# Patient Record
Sex: FEMALE | Race: BLACK OR AFRICAN AMERICAN | NOT HISPANIC OR LATINO | URBAN - METROPOLITAN AREA
[De-identification: names, ages, dates, MRNs, and addresses within clinical notes are randomized per-mention and may not be internally consistent; named-entity substitution may affect disease eponyms.]

---

## 2020-11-15 ENCOUNTER — INPATIENT (INPATIENT)
Facility: HOSPITAL | Age: 35
LOS: 3 days | Discharge: HOME | End: 2020-11-19
Attending: INTERNAL MEDICINE | Admitting: INTERNAL MEDICINE
Payer: MEDICAID

## 2020-11-15 VITALS
HEIGHT: 65 IN | OXYGEN SATURATION: 100 % | WEIGHT: 134.92 LBS | RESPIRATION RATE: 16 BRPM | SYSTOLIC BLOOD PRESSURE: 118 MMHG | DIASTOLIC BLOOD PRESSURE: 90 MMHG | TEMPERATURE: 98 F | HEART RATE: 60 BPM

## 2020-11-15 DIAGNOSIS — Z98.890 OTHER SPECIFIED POSTPROCEDURAL STATES: Chronic | ICD-10-CM
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
## 2020-11-15 LAB
ALBUMIN SERPL ELPH-MCNC: 2.5 G/DL — LOW (ref 3.3–5)
ALP SERPL-CCNC: 102 U/L — SIGNIFICANT CHANGE UP (ref 40–120)
ALT FLD-CCNC: 21 U/L — SIGNIFICANT CHANGE UP (ref 12–78)
AMMONIA BLD-MCNC: 29 UMOL/L — SIGNIFICANT CHANGE UP (ref 11–32)
AMPHET UR-MCNC: NEGATIVE — SIGNIFICANT CHANGE UP
ANION GAP SERPL CALC-SCNC: 8 MMOL/L — SIGNIFICANT CHANGE UP (ref 5–17)
APAP SERPL-MCNC: <2 UG/ML — LOW (ref 10–30)
APPEARANCE UR: CLEAR — SIGNIFICANT CHANGE UP
AST SERPL-CCNC: 28 U/L — SIGNIFICANT CHANGE UP (ref 15–37)
BACTERIA # UR AUTO: ABNORMAL
BARBITURATES UR SCN-MCNC: NEGATIVE — SIGNIFICANT CHANGE UP
BASOPHILS # BLD AUTO: 0.12 K/UL — SIGNIFICANT CHANGE UP (ref 0–0.2)
BASOPHILS NFR BLD AUTO: 1.1 % — SIGNIFICANT CHANGE UP (ref 0–2)
BENZODIAZ UR-MCNC: NEGATIVE — SIGNIFICANT CHANGE UP
BILIRUB SERPL-MCNC: 1.7 MG/DL — HIGH (ref 0.2–1.2)
BILIRUB UR-MCNC: NEGATIVE — SIGNIFICANT CHANGE UP
BUN SERPL-MCNC: 30 MG/DL — HIGH (ref 7–23)
CALCIUM SERPL-MCNC: 7.8 MG/DL — LOW (ref 8.5–10.1)
CHLORIDE SERPL-SCNC: 108 MMOL/L — SIGNIFICANT CHANGE UP (ref 96–108)
CO2 SERPL-SCNC: 22 MMOL/L — SIGNIFICANT CHANGE UP (ref 22–31)
COCAINE METAB.OTHER UR-MCNC: NEGATIVE — SIGNIFICANT CHANGE UP
COLOR SPEC: YELLOW — SIGNIFICANT CHANGE UP
CREAT SERPL-MCNC: 1.62 MG/DL — HIGH (ref 0.5–1.3)
DIFF PNL FLD: NEGATIVE — SIGNIFICANT CHANGE UP
EOSINOPHIL # BLD AUTO: 0.08 K/UL — SIGNIFICANT CHANGE UP (ref 0–0.5)
EOSINOPHIL NFR BLD AUTO: 0.7 % — SIGNIFICANT CHANGE UP (ref 0–6)
EPI CELLS # UR: ABNORMAL
ETHANOL SERPL-MCNC: <10 MG/DL — SIGNIFICANT CHANGE UP (ref 0–10)
GLUCOSE BLDC GLUCOMTR-MCNC: 125 MG/DL — HIGH (ref 70–99)
GLUCOSE BLDC GLUCOMTR-MCNC: 147 MG/DL — HIGH (ref 70–99)
GLUCOSE BLDC GLUCOMTR-MCNC: 155 MG/DL — HIGH (ref 70–99)
GLUCOSE BLDC GLUCOMTR-MCNC: 161 MG/DL — HIGH (ref 70–99)
GLUCOSE BLDC GLUCOMTR-MCNC: 172 MG/DL — HIGH (ref 70–99)
GLUCOSE SERPL-MCNC: 175 MG/DL — HIGH (ref 70–99)
GLUCOSE UR QL: NEGATIVE MG/DL — SIGNIFICANT CHANGE UP
HCG SERPL-ACNC: <1 MIU/ML — SIGNIFICANT CHANGE UP
HCT VFR BLD CALC: 38 % — SIGNIFICANT CHANGE UP (ref 34.5–45)
HGB BLD-MCNC: 11.2 G/DL — LOW (ref 11.5–15.5)
HYALINE CASTS # UR AUTO: ABNORMAL /LPF
IMM GRANULOCYTES NFR BLD AUTO: 0.4 % — SIGNIFICANT CHANGE UP (ref 0–1.5)
KETONES UR-MCNC: NEGATIVE — SIGNIFICANT CHANGE UP
LACTATE SERPL-SCNC: 2.1 MMOL/L — HIGH (ref 0.7–2)
LEUKOCYTE ESTERASE UR-ACNC: NEGATIVE — SIGNIFICANT CHANGE UP
LIDOCAIN IGE QN: 59 U/L — LOW (ref 73–393)
LYMPHOCYTES # BLD AUTO: 1.07 K/UL — SIGNIFICANT CHANGE UP (ref 1–3.3)
LYMPHOCYTES # BLD AUTO: 9.4 % — LOW (ref 13–44)
MAGNESIUM SERPL-MCNC: 1.8 MG/DL — SIGNIFICANT CHANGE UP (ref 1.6–2.6)
MCHC RBC-ENTMCNC: 24.8 PG — LOW (ref 27–34)
MCHC RBC-ENTMCNC: 29.5 GM/DL — LOW (ref 32–36)
MCV RBC AUTO: 84.1 FL — SIGNIFICANT CHANGE UP (ref 80–100)
METHADONE UR-MCNC: NEGATIVE — SIGNIFICANT CHANGE UP
MONOCYTES # BLD AUTO: 0.97 K/UL — HIGH (ref 0–0.9)
MONOCYTES NFR BLD AUTO: 8.6 % — SIGNIFICANT CHANGE UP (ref 2–14)
NEUTROPHILS # BLD AUTO: 9.04 K/UL — HIGH (ref 1.8–7.4)
NEUTROPHILS NFR BLD AUTO: 79.8 % — HIGH (ref 43–77)
NITRITE UR-MCNC: NEGATIVE — SIGNIFICANT CHANGE UP
NRBC # BLD: 0 /100 WBCS — SIGNIFICANT CHANGE UP (ref 0–0)
OPIATES UR-MCNC: NEGATIVE — SIGNIFICANT CHANGE UP
PCP SPEC-MCNC: SIGNIFICANT CHANGE UP
PCP UR-MCNC: NEGATIVE — SIGNIFICANT CHANGE UP
PH UR: 5 — SIGNIFICANT CHANGE UP (ref 5–8)
PLATELET # BLD AUTO: 385 K/UL — SIGNIFICANT CHANGE UP (ref 150–400)
POTASSIUM SERPL-MCNC: 4.5 MMOL/L — SIGNIFICANT CHANGE UP (ref 3.5–5.3)
POTASSIUM SERPL-SCNC: 4.5 MMOL/L — SIGNIFICANT CHANGE UP (ref 3.5–5.3)
PROT SERPL-MCNC: 6.3 GM/DL — SIGNIFICANT CHANGE UP (ref 6–8.3)
PROT UR-MCNC: 100 MG/DL
RBC # BLD: 4.52 M/UL — SIGNIFICANT CHANGE UP (ref 3.8–5.2)
RBC # FLD: 19.5 % — HIGH (ref 10.3–14.5)
SALICYLATES SERPL-MCNC: <1.7 MG/DL — LOW (ref 2.8–20)
SARS-COV-2 RNA SPEC QL NAA+PROBE: SIGNIFICANT CHANGE UP
SODIUM SERPL-SCNC: 138 MMOL/L — SIGNIFICANT CHANGE UP (ref 135–145)
SP GR SPEC: 1.01 — SIGNIFICANT CHANGE UP (ref 1.01–1.02)
THC UR QL: NEGATIVE — SIGNIFICANT CHANGE UP
UROBILINOGEN FLD QL: NEGATIVE MG/DL — SIGNIFICANT CHANGE UP
WBC # BLD: 11.33 K/UL — HIGH (ref 3.8–10.5)
WBC # FLD AUTO: 11.33 K/UL — HIGH (ref 3.8–10.5)

## 2020-11-15 PROCEDURE — 93010 ELECTROCARDIOGRAM REPORT: CPT

## 2020-11-15 PROCEDURE — 74177 CT ABD & PELVIS W/CONTRAST: CPT | Mod: 26,MC

## 2020-11-15 PROCEDURE — G1004: CPT

## 2020-11-15 PROCEDURE — 99285 EMERGENCY DEPT VISIT HI MDM: CPT

## 2020-11-15 PROCEDURE — 70450 CT HEAD/BRAIN W/O DYE: CPT | Mod: 26,ME

## 2020-11-15 PROCEDURE — 99291 CRITICAL CARE FIRST HOUR: CPT

## 2020-11-15 RX ORDER — HEPARIN SODIUM 5000 [USP'U]/ML
5000 INJECTION INTRAVENOUS; SUBCUTANEOUS EVERY 8 HOURS
Refills: 0 | Status: DISCONTINUED | OUTPATIENT
Start: 2020-11-15 | End: 2020-11-19

## 2020-11-15 RX ORDER — ONDANSETRON 8 MG/1
4 TABLET, FILM COATED ORAL ONCE
Refills: 0 | Status: COMPLETED | OUTPATIENT
Start: 2020-11-15 | End: 2020-11-15

## 2020-11-15 RX ORDER — SODIUM CHLORIDE 9 MG/ML
1000 INJECTION INTRAMUSCULAR; INTRAVENOUS; SUBCUTANEOUS ONCE
Refills: 0 | Status: COMPLETED | OUTPATIENT
Start: 2020-11-15 | End: 2020-11-15

## 2020-11-15 RX ORDER — PIPERACILLIN AND TAZOBACTAM 4; .5 G/20ML; G/20ML
3.38 INJECTION, POWDER, LYOPHILIZED, FOR SOLUTION INTRAVENOUS EVERY 8 HOURS
Refills: 0 | Status: DISCONTINUED | OUTPATIENT
Start: 2020-11-15 | End: 2020-11-18

## 2020-11-15 RX ORDER — CALCIUM GLUCONATE 100 MG/ML
1 VIAL (ML) INTRAVENOUS ONCE
Refills: 0 | Status: COMPLETED | OUTPATIENT
Start: 2020-11-15 | End: 2020-11-15

## 2020-11-15 RX ORDER — NOREPINEPHRINE BITARTRATE/D5W 8 MG/250ML
0.05 PLASTIC BAG, INJECTION (ML) INTRAVENOUS
Qty: 8 | Refills: 0 | Status: DISCONTINUED | OUTPATIENT
Start: 2020-11-15 | End: 2020-11-17

## 2020-11-15 RX ORDER — SODIUM CHLORIDE 9 MG/ML
1000 INJECTION, SOLUTION INTRAVENOUS
Refills: 0 | Status: DISCONTINUED | OUTPATIENT
Start: 2020-11-15 | End: 2020-11-15

## 2020-11-15 RX ORDER — GLUCAGON INJECTION, SOLUTION 0.5 MG/.1ML
0.05 INJECTION, SOLUTION SUBCUTANEOUS
Qty: 5 | Refills: 0 | Status: DISCONTINUED | OUTPATIENT
Start: 2020-11-15 | End: 2020-11-15

## 2020-11-15 RX ORDER — PIPERACILLIN AND TAZOBACTAM 4; .5 G/20ML; G/20ML
3.38 INJECTION, POWDER, LYOPHILIZED, FOR SOLUTION INTRAVENOUS ONCE
Refills: 0 | Status: COMPLETED | OUTPATIENT
Start: 2020-11-15 | End: 2020-11-15

## 2020-11-15 RX ORDER — CHLORHEXIDINE GLUCONATE 213 G/1000ML
1 SOLUTION TOPICAL
Refills: 0 | Status: DISCONTINUED | OUTPATIENT
Start: 2020-11-15 | End: 2020-11-18

## 2020-11-15 RX ORDER — GLUCAGON INJECTION, SOLUTION 0.5 MG/.1ML
5 INJECTION, SOLUTION SUBCUTANEOUS ONCE
Refills: 0 | Status: COMPLETED | OUTPATIENT
Start: 2020-11-15 | End: 2020-11-15

## 2020-11-15 RX ADMIN — ONDANSETRON 4 MILLIGRAM(S): 8 TABLET, FILM COATED ORAL at 17:32

## 2020-11-15 RX ADMIN — SODIUM CHLORIDE 1000 MILLILITER(S): 9 INJECTION INTRAMUSCULAR; INTRAVENOUS; SUBCUTANEOUS at 11:45

## 2020-11-15 RX ADMIN — HEPARIN SODIUM 5000 UNIT(S): 5000 INJECTION INTRAVENOUS; SUBCUTANEOUS at 21:00

## 2020-11-15 RX ADMIN — SODIUM CHLORIDE 1000 MILLILITER(S): 9 INJECTION INTRAMUSCULAR; INTRAVENOUS; SUBCUTANEOUS at 14:21

## 2020-11-15 RX ADMIN — PIPERACILLIN AND TAZOBACTAM 200 GRAM(S): 4; .5 INJECTION, POWDER, LYOPHILIZED, FOR SOLUTION INTRAVENOUS at 19:00

## 2020-11-15 RX ADMIN — GLUCAGON INJECTION, SOLUTION 5 MILLIGRAM(S): 0.5 INJECTION, SOLUTION SUBCUTANEOUS at 16:32

## 2020-11-15 RX ADMIN — ONDANSETRON 4 MILLIGRAM(S): 8 TABLET, FILM COATED ORAL at 19:45

## 2020-11-15 RX ADMIN — Medication 5.74 MICROGRAM(S)/KG/MIN: at 16:25

## 2020-11-15 RX ADMIN — Medication 100 GRAM(S): at 16:26

## 2020-11-15 RX ADMIN — GLUCAGON INJECTION, SOLUTION 30.6 MG/KG/HR: 0.5 INJECTION, SOLUTION SUBCUTANEOUS at 18:49

## 2020-11-15 NOTE — ED PROVIDER NOTE - PROGRESS NOTE DETAILS
Sw contacted for care of pt's 2 young children. CPS called. Discussed CT brain findings w/ Radio. Pt persistently hypotensive. Will consult ICU for admission. Consider possible tox syndrome - hypoglycemia, hypotension, bradycardic, alerted, ? BB OD.

## 2020-11-15 NOTE — ED ADULT NURSE NOTE - CHIEF COMPLAINT QUOTE
Pt picked up at [facility] for AMS pt hypoglycemic at scene  Oral glucose given with some relief fs on arrival 147

## 2020-11-15 NOTE — H&P ADULT - ASSESSMENT
35 year old female with PMH ? HTN , admitted with AMS, hypoglycemia, hypotension. Concern for B.blocker overdose. Patient confused, unable to give PMH, children with patient at bedside.   Patient is a 35y old  Female who presents with a chief complaint of       PLAN:  ========  NEURO:  - CT head- negative for acute finding , nonspecific hypodensities noted   - MRI in AM   - hope neurologic status will improve with improvement in BP  - complete tox screen     PULM:  -Ct chest: small B/L pleural effusions, ground glass haziness noted   -covid swap pending   - maintain O2 saturation > 92%  CV:  -? b. blocker overdose  -glucagon/ calcium   - may require norepinephrine Infusion Maintain MAP > 65    -History of HTN   - echo in AM   -resume home medications when BP allows     GI:  - NPO   -advance diet as tolerated     RENAL:  -maintain urine output > 0.5cc/kg/hr     :  MICHELE yes [  ] insertion date no [X  ]     ID:  - mild leukocytosis   - evaluate off ABX     ENDO:  glucagon  Injectable 5 milliGRAM(s)    MUSC:   -no deficit noted    35 year old female with PMH ? HTN , admitted with AMS, hypoglycemia, hypotension. Concern for B.blocker overdose. Patient confused, unable to give PMH, children with patient at bedside.   Patient is a 35y old  Female who presents with a chief complaint of       PLAN:  ========  NEURO:  - CT head- negative for acute finding , nonspecific hypodensities noted   - MRI in AM   - hope neurologic status will improve with improvement in BP  - complete tox screen     PULM:  -Ct chest: small B/L pleural effusions, ground glass haziness noted   -covid swap pending   - maintain O2 saturation > 92%  CV:  -? b. blocker overdose  -glucagon/ calcium   - may require norepinephrine Infusion Maintain MAP > 65    -History of HTN   - echo in AM   -resume home medications when BP allows     GI:  - NPO   -advance diet as tolerated     RENAL:  -maintain urine output > 0.5cc/kg/hr     :  MICHELE yes [  ] insertion date no [X  ]     ID:  - mild leukocytosis , lactate 2.1  - will add empiric antibiotics     ENDO:  glucagon  Injectable 5 milliGRAM(s)    MUSC:   -no deficit noted    35 year old female with PMH ? HTN , admitted with AMS, hypoglycemia, hypotension. Concern for B.blocker overdose. Patient confused, unable to give PMH, children with patient at bedside.   Patient is a 35y old  Female who presents with a chief complaint of       PLAN:  ========  NEURO:  - CT head- negative for acute finding , nonspecific hypodensities noted   - MRI in AM   - monitor for neurologic status will improve with improvement in BP  - complete tox screen     PULM:  -Ct chest: small B/L pleural effusions, ground glass haziness noted   -covid swap pending   - maintain O2 saturation > 92%    CV:  -? b. blocker overdose  -glucagon/ calcium   - may require norepinephrine Infusion Maintain MAP > 65    -History of HTN   - echo in AM   -resume home medications when BP allows     GI:  - NPO   -advance diet as tolerated     RENAL:  -maintain urine output > 0.5cc/kg/hr     :  MICHELE yes [  ] insertion date no [X  ]     ID:  - mild leukocytosis , lactate 2.1  - will add empiric antibiotics     ENDO:  glucagon  Injectable 5 milliGRAM(s)    MUSC:   -no deficit noted

## 2020-11-15 NOTE — H&P ADULT - NSHPPHYSICALEXAM_GEN_ALL_CORE
GENERAL: lethargic, disheveled   HEAD:  Atraumatic, Normocephalic, no nuchal rigidity   EYES: EOMI, PERRLA, conjunctiva and sclera clear  ENMT: No tonsillar erythema, exudates, or enlargement; Moist mucous membranes, Good dentition, No lesions  NECK: Supple, No JVD, Normal thyroid  NERVOUS SYSTEM: lethargic, oriented to person only.  Motor Strength 5/5 B/L upper and lower extremities; DTRs 2+ intact and symmetric  CHEST/LUNG: Clear to percussion bilaterally; No rales, rhonchi, wheezing, or rubs  HEART: Bradycardia, No murmurs, rubs, or gallops  ABDOMEN: Softly distended,  Nontender,  Bowel sounds present  EXTREMITIES:  trace edema , 2+ Peripheral Pulses, No clubbing, cyanosis, edema  LYMPH: No lymphadenopathy noted  SKIN: No rashes or lesions

## 2020-11-15 NOTE — H&P ADULT - ATTENDING COMMENTS
34 yo F with unknown past medical hx p/w shock and confusion. Pt arrives from shelter and was recently discharged from outside hospital. Pt w/ AMS initially secondary to hypoglycemia and was treated w/ normal FS now. Pt then found to be in shock despite fluid resuscitation of 4 L in ER. Pt noted to bradycardic and hypotensive and pt able to report that she took carvedilol at home. Given constellation of signs concern for BB overdose. No sign of sepsis or focal infection. Pt without nuchal rigidity or HA or fever so suspicion meningitis low. Moving all extremities equally. CTH w/o acute findings but will need MRI to help evaluate once pt stable for transport. Utox ordered. f/u pan cx. f/u covid. CT A/P negative. Started on empiric abx. Given vasopressors-maintain map>65, calcium given and glucagon ordered- if good response start gtt. ECHo ordered. EKG no ischemic changes and qrs normal- only sinus bradycardic. 34 yo F with unknown past medical hx p/w shock and confusion. Pt arrives from shelter and was recently discharged from outside hospital. Pt w/ AMS initially secondary to hypoglycemia and was treated w/ normal FS now. Pt then found to be in shock despite fluid resuscitation of 4 L in ER. Pt noted to bradycardic and hypotensive and pt able to report that she took carvedilol at home. Given constellation of signs concern for BB overdose. No sign of sepsis or focal infection. Pt without nuchal rigidity or HA or fever so suspicion meningitis low. Moving all extremities equally. CTH w/o acute findings but will need MRI to help evaluate once pt stable for transport. Utox ordered. f/u pan cx. f/u covid. CT A/P negative. Started on empiric abx. Given vasopressors-maintain map>65, calcium given and glucagon ordered- if good response start gtt. ECHo ordered. EKG no ischemic changes and qrs normal- only sinus bradycardic. qtc prolonged poss from antidepressant use. Avoid qtc prolong meds.

## 2020-11-15 NOTE — H&P ADULT - NSHPLABSRESULTS_GEN_ALL_CORE
ICU Vital Signs Last 24 Hrs  T(C): 36.6 (15 Nov 2020 14:14), Max: 36.7 (15 Nov 2020 10:33)  T(F): 97.9 (15 Nov 2020 14:14), Max: 98 (15 Nov 2020 10:33)  HR: 52 (15 Nov 2020 15:06) (52 - 60)  BP: 83/48 (15 Nov 2020 15:06) (68/44 - 118/90)  BP(mean): --  ABP: --  ABP(mean): --  RR: 16 (15 Nov 2020 15:06) (16 - 16)  SpO2: 98% (15 Nov 2020 15:06) (98% - 100%)    LABS  ========                        11.2   11.33 )-----------( 385      ( 15 Nov 2020 11:43 )             38.0     11-15    138  |  108  |  30<H>  ----------------------------<  175<H>  4.5   |  22  |  1.62<H>    Ca    7.8<L>      15 Nov 2020 11:31  Mg     1.8     11-15    TPro  6.3  /  Alb  2.5<L>  /  TBili  1.7<H>  /  DBili  x   /  AST  28  /  ALT  21  /  AlkPhos  102  11-15    CAPILLARY BLOOD GLUCOSE  POCT Blood Glucose.: 172 mg/dL (15 Nov 2020 14:13)  POCT Blood Glucose.: 161 mg/dL (15 Nov 2020 11:17)  POCT Blood Glucose.: 147 mg/dL (15 Nov 2020 10:34)    CT Abdomen and Pelvis w/ IV Cont (11.15.20 @ 12:30) >    IMPRESSION: Small pleural effusions and mild ascites. Mild pulmonary edema.  No bowel obstruction.    CT Head No Cont (11.15.20 @ 12:30) >  Impression:  Head CT without contrast  1. Nonspecific focal hypodensities in the bilateral ganglionic regions as well as in the periventricular and subcortical white matter; in the correct clinical setting, findings could represent demyelinating disease in this 35-year-old female; age-indeterminate sequelae of prior infectious or inflammatory disorders as well as diabetes, hypertension and vasculopathies are additional considerations.  2. No acuteintracranial hemorrhage, extra-axial collection, hydrocephalus, midline shift or space-occupying mass lesion.  3. Foci of gas in the dorsal dural venous sinuses, likely secondary to intravenous line usage; other intravenous sources cannot be entirelyexcluded. Follow-up may be indicated.

## 2020-11-15 NOTE — ED PROVIDER NOTE - CLINICAL SUMMARY MEDICAL DECISION MAKING FREE TEXT BOX
36yo F BIBA AMS. Pt hypoglycemia, hypotension w/ EMS. BS 140s, BP WNL on ED arrival. Pt unable to provide further HPI/ROS d/t AMS. Pt w/ 2 young children in ED. Pt moaning, appears uncomfortable, + disheveled, no focal neuro deficits, abd + distended. Plan: Consult SW, obtain CT brain, AP, CBC, CMP, mag / phos, EtOH, ASA, Tylenol levels, UA/C, Utox, Ammonia, lactate ECG. Re-eval. CT brain d/w Radio. Labs w/o significant abnormalities. Pt w/ persistent hypotension despite 4L NS. ICU consulted, will admit pt.

## 2020-11-15 NOTE — ED PROVIDER NOTE - OBJECTIVE STATEMENT
36yo F BIBA d/t AMS, hypoglycemia, hypotension. Pt found walking around lobby of women's shelter, moaning / altered. Staff called EMS. EMS reported FS 50, initial BP 80/60, gave glucose paste / food, FS on ED arrival 147. EMS reports pt w/ [facility] bracelet on her wrist. Pt w/ her 2 young children. Daughter endorses mother was in hospital a few days ago. Family arrived to shelter 2 days ago per staff. Pt moaning, not answering questions. Abd seems distended. Lower abd midline vertical scar. Pt appears disheveled.     Unknown PMH/PSH/meds/allergies/LMP/social hx.

## 2020-11-15 NOTE — H&P ADULT - HISTORY OF PRESENT ILLNESS
36yo F BIBA d/t AMS, hypoglycemia, hypotension. Pt found walking around lobby of [facility]'s shelter, moaning / altered. Staff called EMS. EMS reported FS50, initial BP 80/60, gave glucose paste / food, FS on ED arrival 147  EMS reports pt w/ [facility] bracelet on her wrist. Pt w/ her 2 young children. Daughter endorses mother was in hospital a few days ago. Family arrived to shelter 2 days ago per staff. Pt moaning, not answering questions. Abd seems distended. Lower abd midline vertical scar. Pt appears disheveled.     ICU consulted for persistent hypotension despite 4 L fluid resuscitation   On examination patient awake but lethargic, answers some questions, lives in [place], takes carvedilol and other medications but does not know the name.   Patient remains hypotensive, concern for B. Blocker OD.     Patients daughters are at the bedside, social work involved.

## 2020-11-15 NOTE — H&P ADULT - NSHPSOCIALHISTORY_GEN_ALL_CORE
Patient currently living in a women's shelter in [place] ( just there 2 days)   Substance abuse/ tobacco use : unknown

## 2020-11-15 NOTE — ED ADULT NURSE NOTE - OBJECTIVE STATEMENT
unknown - will not speak- EMS reports unusual behavior a homeless shelter, 2 young children presents with patient. multiple sores to BLE. Distended abdomen- responds to only certain questions.

## 2020-11-15 NOTE — ED ADULT TRIAGE NOTE - CHIEF COMPLAINT QUOTE
Pt picked up at [facility] for AMS pt hypoglycemic at scene  Oral glucose given with some relief fs on arrival 147

## 2020-11-16 LAB
ALBUMIN SERPL ELPH-MCNC: 2.3 G/DL — LOW (ref 3.3–5)
ALP SERPL-CCNC: 99 U/L — SIGNIFICANT CHANGE UP (ref 40–120)
ALT FLD-CCNC: 15 U/L — SIGNIFICANT CHANGE UP (ref 12–78)
ANION GAP SERPL CALC-SCNC: 9 MMOL/L — SIGNIFICANT CHANGE UP (ref 5–17)
AST SERPL-CCNC: 19 U/L — SIGNIFICANT CHANGE UP (ref 15–37)
BASOPHILS # BLD AUTO: 0.06 K/UL — SIGNIFICANT CHANGE UP (ref 0–0.2)
BASOPHILS NFR BLD AUTO: 0.7 % — SIGNIFICANT CHANGE UP (ref 0–2)
BILIRUB SERPL-MCNC: 1.7 MG/DL — HIGH (ref 0.2–1.2)
BUN SERPL-MCNC: 31 MG/DL — HIGH (ref 7–23)
CALCIUM SERPL-MCNC: 7.9 MG/DL — LOW (ref 8.5–10.1)
CHLORIDE SERPL-SCNC: 107 MMOL/L — SIGNIFICANT CHANGE UP (ref 96–108)
CO2 SERPL-SCNC: 21 MMOL/L — LOW (ref 22–31)
CREAT SERPL-MCNC: 1.32 MG/DL — HIGH (ref 0.5–1.3)
CULTURE RESULTS: SIGNIFICANT CHANGE UP
EOSINOPHIL # BLD AUTO: 0 K/UL — SIGNIFICANT CHANGE UP (ref 0–0.5)
EOSINOPHIL NFR BLD AUTO: 0 % — SIGNIFICANT CHANGE UP (ref 0–6)
GLUCOSE BLDC GLUCOMTR-MCNC: 126 MG/DL — HIGH (ref 70–99)
GLUCOSE BLDC GLUCOMTR-MCNC: 79 MG/DL — SIGNIFICANT CHANGE UP (ref 70–99)
GLUCOSE SERPL-MCNC: 180 MG/DL — HIGH (ref 70–99)
HCT VFR BLD CALC: 40.9 % — SIGNIFICANT CHANGE UP (ref 34.5–45)
HGB BLD-MCNC: 12.5 G/DL — SIGNIFICANT CHANGE UP (ref 11.5–15.5)
HIV 1+2 AB+HIV1 P24 AG SERPL QL IA: SIGNIFICANT CHANGE UP
IMM GRANULOCYTES NFR BLD AUTO: 0.2 % — SIGNIFICANT CHANGE UP (ref 0–1.5)
INR BLD: 1.55 RATIO — HIGH (ref 0.88–1.16)
LYMPHOCYTES # BLD AUTO: 0.71 K/UL — LOW (ref 1–3.3)
LYMPHOCYTES # BLD AUTO: 8 % — LOW (ref 13–44)
MAGNESIUM SERPL-MCNC: 1.9 MG/DL — SIGNIFICANT CHANGE UP (ref 1.6–2.6)
MCHC RBC-ENTMCNC: 24.9 PG — LOW (ref 27–34)
MCHC RBC-ENTMCNC: 30.6 GM/DL — LOW (ref 32–36)
MCV RBC AUTO: 81.3 FL — SIGNIFICANT CHANGE UP (ref 80–100)
MONOCYTES # BLD AUTO: 0.6 K/UL — SIGNIFICANT CHANGE UP (ref 0–0.9)
MONOCYTES NFR BLD AUTO: 6.8 % — SIGNIFICANT CHANGE UP (ref 2–14)
NEUTROPHILS # BLD AUTO: 7.49 K/UL — HIGH (ref 1.8–7.4)
NEUTROPHILS NFR BLD AUTO: 84.3 % — HIGH (ref 43–77)
NRBC # BLD: 0 /100 WBCS — SIGNIFICANT CHANGE UP (ref 0–0)
PHOSPHATE SERPL-MCNC: 4 MG/DL — SIGNIFICANT CHANGE UP (ref 2.5–4.5)
PLATELET # BLD AUTO: 428 K/UL — HIGH (ref 150–400)
POTASSIUM SERPL-MCNC: 4 MMOL/L — SIGNIFICANT CHANGE UP (ref 3.5–5.3)
POTASSIUM SERPL-SCNC: 4 MMOL/L — SIGNIFICANT CHANGE UP (ref 3.5–5.3)
PROT SERPL-MCNC: 5.9 GM/DL — LOW (ref 6–8.3)
PROTHROM AB SERPL-ACNC: 17.6 SEC — HIGH (ref 10.6–13.6)
RBC # BLD: 5.03 M/UL — SIGNIFICANT CHANGE UP (ref 3.8–5.2)
RBC # FLD: 19.3 % — HIGH (ref 10.3–14.5)
SODIUM SERPL-SCNC: 137 MMOL/L — SIGNIFICANT CHANGE UP (ref 135–145)
SPECIMEN SOURCE: SIGNIFICANT CHANGE UP
TROPONIN I SERPL-MCNC: 0.08 NG/ML — HIGH (ref 0.01–0.04)
TROPONIN I SERPL-MCNC: 0.1 NG/ML — HIGH (ref 0.01–0.04)
TSH SERPL-MCNC: 2.99 UIU/ML — SIGNIFICANT CHANGE UP (ref 0.36–3.74)
WBC # BLD: 8.88 K/UL — SIGNIFICANT CHANGE UP (ref 3.8–10.5)
WBC # FLD AUTO: 8.88 K/UL — SIGNIFICANT CHANGE UP (ref 3.8–10.5)

## 2020-11-16 PROCEDURE — 99291 CRITICAL CARE FIRST HOUR: CPT

## 2020-11-16 PROCEDURE — 93306 TTE W/DOPPLER COMPLETE: CPT | Mod: 26

## 2020-11-16 RX ADMIN — HEPARIN SODIUM 5000 UNIT(S): 5000 INJECTION INTRAVENOUS; SUBCUTANEOUS at 21:44

## 2020-11-16 RX ADMIN — PIPERACILLIN AND TAZOBACTAM 25 GRAM(S): 4; .5 INJECTION, POWDER, LYOPHILIZED, FOR SOLUTION INTRAVENOUS at 04:46

## 2020-11-16 RX ADMIN — HEPARIN SODIUM 5000 UNIT(S): 5000 INJECTION INTRAVENOUS; SUBCUTANEOUS at 05:28

## 2020-11-16 RX ADMIN — PIPERACILLIN AND TAZOBACTAM 25 GRAM(S): 4; .5 INJECTION, POWDER, LYOPHILIZED, FOR SOLUTION INTRAVENOUS at 21:44

## 2020-11-16 RX ADMIN — Medication 5.74 MICROGRAM(S)/KG/MIN: at 04:33

## 2020-11-16 RX ADMIN — HEPARIN SODIUM 5000 UNIT(S): 5000 INJECTION INTRAVENOUS; SUBCUTANEOUS at 14:31

## 2020-11-16 RX ADMIN — PIPERACILLIN AND TAZOBACTAM 25 GRAM(S): 4; .5 INJECTION, POWDER, LYOPHILIZED, FOR SOLUTION INTRAVENOUS at 14:32

## 2020-11-16 RX ADMIN — CHLORHEXIDINE GLUCONATE 1 APPLICATION(S): 213 SOLUTION TOPICAL at 10:57

## 2020-11-16 NOTE — PROGRESS NOTE ADULT - SUBJECTIVE AND OBJECTIVE BOX
INTERVAL HPI/OVERNIGHT EVENTS:      CENTRAL LINE: [ ] YES [ ] NO  LOCATION:       MICHELE: [ ] YES [ ] NO        A-LINE:  [ ] YES [ ] NO  LOCATION:       GLOBAL ISSUE/BEST PRACTICE:  Analgesia:  Sedation:  HOB elevation: yes  Stress ulcer prophylaxis: Protonix  VTE prophylaxis: HSQ  Oral Care: Chlorhexidine  Glycemic control: ISS/Lantus  Nutrition:    REVIEW OF SYSTEMS: [] Unable to obtain because:    CONSTITUTIONAL: No fever, weight loss, or fatigue  EYES: No eye pain, visual disturbances, or discharge  ENMT:  No difficulty hearing, tinnitus, vertigo; No sinus or throat pain  NECK: No pain or stiffness  RESPIRATORY: No cough, wheezing, chills or hemoptysis; No shortness of breath  CARDIOVASCULAR: No chest pain, palpitations, dizziness, or leg swelling  GASTROINTESTINAL: No abdominal or epigastric pain. No nausea, vomiting, or hematemesis; No diarrhea or constipation. No melena or hematochezia.  GENITOURINARY: No dysuria, frequency, hematuria, or incontinence  NEUROLOGICAL: No headaches, memory loss, loss of strength, numbness, or tremors  SKIN: No itching, burning, rashes, or lesions     PHYSICAL EXAM:    GENERAL: NAD, well-groomed, well-developed  HEAD:  Atraumatic, Normocephalic  EYES: EOMI, PERRLA, conjunctiva and sclera clear  ENMT: No tonsillar erythema, exudates, or enlargement; Moist mucous membranes, No lesions  NECK: Supple, No JVD, Normal thyroid  CHEST/LUNG: Clear to auscultation bilaterally; No rales, rhonchi, wheezing, or rubs  HEART: Regular rate and rhythm; No murmurs, rubs, or gallops  ABDOMEN: Soft, Nontender, Nondistended; Bowel sounds present  EXTREMITIES:  2+ Peripheral Pulses, No clubbing, cyanosis, or edema  LYMPH: No lymphadenopathy noted  SKIN: No rashes or lesions  NERVOUS SYSTEM:  Alert & Oriented X3, Good concentration; Motor Strength 5/5 B/L upper and lower extremities; DTRs 2+ intact and symmetric    ICU Vital Signs Last 24 Hrs  T(C): 35 (2020 07:30), Max: 36.7 (15 Nov 2020 10:33)  T(F): 95 (2020 07:30), Max: 98 (15 Nov 2020 10:33)  HR: 77 (2020 07:00) (52 - 92)  BP: 120/69 (2020 07:00) (59/27 - 125/81)  BP(mean): 81 (2020 07:00) (34 - 96)  ABP: --  ABP(mean): --  RR: 18 (2020 07:00) (4 - 31)  SpO2: 92% (2020 07:00) (91% - 100%)      I&O's Detail    15 Nov 2020 07:01  -  2020 07:00  --------------------------------------------------------  IN:    Glucagon: 120 mL    IV PiggyBack: 200 mL    Norepinephrine: 582.7 mL  Total IN: 902.7 mL    OUT:    Voided (mL): 2100 mL  Total OUT: 2100 mL    Total NET: -1197.3 mL          MEDICATIONS  NEURO  Meds:   RESPIRATORY  ABG - ( 15 Nov 2020 11:36 )  pH: x     /  pCO2: x     /  pO2: x     / HCO3: x     / Base Excess: x     /  SaO2: x       Lactate: 2.1              Meds:   CARDIOVASCULAR  Meds: norepinephrine Infusion 0.05 MICROgram(s)/kG/Min (5.74 mL/Hr) IV Continuous <Continuous>    GI/NUTRITION  Meds:   GENITOURINARY  Meds:   HEMATOLOGIC  Meds: heparin   Injectable 5000 Unit(s) SubCutaneous every 8 hours    [x] VTE Prophylaxis  INFECTIOUS DISEASES  Meds: piperacillin/tazobactam IVPB.. 3.375 Gram(s) IV Intermittent every 8 hours    ENDOCRINE  CAPILLARY BLOOD GLUCOSE      POCT Blood Glucose.: 126 mg/dL (2020 06:59)  POCT Blood Glucose.: 125 mg/dL (15 Nov 2020 23:17)  POCT Blood Glucose.: 155 mg/dL (15 Nov 2020 17:28)  POCT Blood Glucose.: 172 mg/dL (15 Nov 2020 14:13)  POCT Blood Glucose.: 161 mg/dL (15 Nov 2020 11:17)  POCT Blood Glucose.: 147 mg/dL (15 Nov 2020 10:34)    Meds:   OTHER MEDICATIONS:  chlorhexidine 4% Liquid 1 Application(s) Topical <User Schedule>  :    LABS:                        12.5   8.88  )-----------( 428      ( 2020 04:03 )             40.9      11-16    137  |  107  |  31<H>  ----------------------------<  180<H>  4.0   |  21<L>  |  1.32<H>    Ca    7.9<L>      2020 04:03  Phos  4.0     -  Mg     1.9     -    TPro  5.9<L>  /  Alb  2.3<L>  /  TBili  1.7<H>  /  DBili  x   /  AST  19  /  ALT  15  /  AlkPhos  99  11-16    PT/INR - ( 2020 04:03 )   PT: 17.6 sec;   INR: 1.55 ratio           ## COVID Panel  COVID-19 PCR: NotDetec (11-15-20 @ 16:22)    Urinalysis Basic - ( 15 Nov 2020 19:30 )    Color: Yellow / Appearance: Clear / S.010 / pH: x  Gluc: x / Ketone: Negative  / Bili: Negative / Urobili: Negative mg/dL   Blood: x / Protein: 100 mg/dL / Nitrite: Negative   Leuk Esterase: Negative / RBC: x / WBC x   Sq Epi: x / Non Sq Epi: Moderate / Bacteria: Few                RADIOLOGY & ADDITIONAL STUDIES:     INTERVAL HPI/OVERNIGHT EVENTS:    Discussed with patient at bedside.  Patient reports taking medications as directed, however cannot recall medications, or pharmacy she last filled.  Reports she goes to [facility] frequently for shortness of breath and difficulty breathing.  Reports 2 months ago her EF<15% and she was offered a life vest, but declined.  Call made to SW given involvement of CPS yesterday.  Pt reports only able to ambulate 20 to 25 feet 2/2 shortness of breath.  Noted to have persistent LE swelling.  Attempting to call for collateral at [place] and [facility].  Weaned off levophed.      CENTRAL LINE: [ ] YES [x ] NO  LOCATION:       MICHELE: [ ] YES [ x] NO        A-LINE:  [ ] YES [x ] NO  LOCATION:       GLOBAL ISSUE/BEST PRACTICE:  Analgesia: N/A  Sedation: N/A  HOB elevation: yes  Stress ulcer prophylaxis: Protonix  VTE prophylaxis: HSQ  Oral Care: Chlorhexidine  Glycemic control: ISS/Lantus  Nutrition: Reg    REVIEW OF SYSTEMS:   CONSTITUTIONAL: No fever, weight loss, or fatigue  EYES: No eye pain, visual disturbances, or discharge  ENMT:  No difficulty hearing, tinnitus, vertigo; No sinus or throat pain  NECK: No pain or stiffness  RESPIRATORY: No cough, wheezing, chills or hemoptysis; + shortness of breath  CARDIOVASCULAR: + leg swelling, No chest pain, palpitations, dizziness  GASTROINTESTINAL: No abdominal or epigastric pain. No nausea, vomiting, or hematemesis; No diarrhea or constipation. No melena or hematochezia.  GENITOURINARY: No dysuria, frequency, hematuria, or incontinence  NEUROLOGICAL: No headaches, memory loss, loss of strength, numbness, or tremors  SKIN: No itching, burning, rashes, or lesions     PHYSICAL EXAM:    GENERAL: NAD, well-groomed, well-developed  HEAD:  Atraumatic, Normocephalic  NECK: Supple, No JVD, Normal thyroid  CHEST/LUNG: Clear to auscultation bilaterally; No rales, rhonchi, wheezing, or rubs  HEART: Regular rate and rhythm; No murmurs, rubs, or gallops  ABDOMEN: Soft, Nontender, Nondistended; Bowel sounds present  EXTREMITIES:  2+ Peripheral Pulses, 3+ LE edema  NERVOUS SYSTEM:  Alert & Oriented X3, Good concentration; Motor Strength 5/5 B/L upper and lower extremities; DTRs 2+ intact and symmetric    ICU Vital Signs Last 24 Hrs  T(C): 35 (2020 07:30), Max: 36.7 (15 Nov 2020 10:33)  T(F): 95 (2020 07:30), Max: 98 (15 Nov 2020 10:33)  HR: 77 (2020 07:00) (52 - 92)  BP: 120/69 (2020 07:00) (59/27 - 125/81)  BP(mean): 81 (2020 07:00) (34 - 96)  ABP: --  ABP(mean): --  RR: 18 (2020 07:00) (4 - 31)  SpO2: 92% (2020 07:00) (91% - 100%)      I&O's Detail    15 Nov 2020 07:01  -  2020 07:00  --------------------------------------------------------  IN:    Glucagon: 120 mL    IV PiggyBack: 200 mL    Norepinephrine: 582.7 mL  Total IN: 902.7 mL    OUT:    Voided (mL): 2100 mL  Total OUT: 2100 mL    Total NET: -1197.3 mL          MEDICATIONS  NEURO  Meds:   RESPIRATORY  ABG - ( 15 Nov 2020 11:36 )  pH: x     /  pCO2: x     /  pO2: x     / HCO3: x     / Base Excess: x     /  SaO2: x       Lactate: 2.1              Meds:   CARDIOVASCULAR  Meds: norepinephrine Infusion 0.05 MICROgram(s)/kG/Min (5.74 mL/Hr) IV Continuous <Continuous>    GI/NUTRITION  Meds:   GENITOURINARY  Meds:   HEMATOLOGIC  Meds: heparin   Injectable 5000 Unit(s) SubCutaneous every 8 hours    [x] VTE Prophylaxis  INFECTIOUS DISEASES  Meds: piperacillin/tazobactam IVPB.. 3.375 Gram(s) IV Intermittent every 8 hours    ENDOCRINE  CAPILLARY BLOOD GLUCOSE      POCT Blood Glucose.: 126 mg/dL (2020 06:59)  POCT Blood Glucose.: 125 mg/dL (15 Nov 2020 23:17)  POCT Blood Glucose.: 155 mg/dL (15 Nov 2020 17:28)  POCT Blood Glucose.: 172 mg/dL (15 Nov 2020 14:13)  POCT Blood Glucose.: 161 mg/dL (15 Nov 2020 11:17)  POCT Blood Glucose.: 147 mg/dL (15 Nov 2020 10:34)    Meds:   OTHER MEDICATIONS:  chlorhexidine 4% Liquid 1 Application(s) Topical <User Schedule>  :    LABS:                        12.5   8.88  )-----------( 428      ( 2020 04:03 )             40.9          137  |  107  |  31<H>  ----------------------------<  180<H>  4.0   |  21<L>  |  1.32<H>    Ca    7.9<L>      2020 04:03  Phos  4.0       Mg     1.9         TPro  5.9<L>  /  Alb  2.3<L>  /  TBili  1.7<H>  /  DBili  x   /  AST  19  /  ALT  15  /  AlkPhos  99      PT/INR - ( 2020 04:03 )   PT: 17.6 sec;   INR: 1.55 ratio           ## COVID Panel  COVID-19 PCR: NotDetec (11-15-20 @ 16:22)    Urinalysis Basic - ( 15 Nov 2020 19:30 )    Color: Yellow / Appearance: Clear / S.010 / pH: x  Gluc: x / Ketone: Negative  / Bili: Negative / Urobili: Negative mg/dL   Blood: x / Protein: 100 mg/dL / Nitrite: Negative   Leuk Esterase: Negative / RBC: x / WBC x   Sq Epi: x / Non Sq Epi: Moderate / Bacteria: Few    RADIOLOGY & ADDITIONAL STUDIES:    < from: CT Abdomen and Pelvis w/ IV Cont (11.15.20 @ 12:30) >    EXAM:  CT ABDOMEN AND PELVIS IC                            PROCEDURE DATE:  11/15/2020          INTERPRETATION:  CLINICAL INFORMATION: distension, altered    COMPARISON: None.    PROCEDURE:  CT of the Abdomen and Pelvis was performed with intravenous contrast.  Intravenous contrast: 90 ml Omnipaque 350. 10 ml discarded.  Oral contrast: None.  Sagittal and coronal reformats were performed.    FINDINGS:    LOWER CHEST: Cardiomegaly.  Small pleural effusions. Groundglass haziness to the lungs are noted.    LIVER: Within normal limits.  BILE DUCTS: Normal caliber.  GALLBLADDER: Cholelithiasis.  SPLEEN: Within normal limits.  PANCREAS: Within normal limits.  ADRENALS: Within normal limits.  KIDNEYS/URETERS: Within normal limits.    BLADDER: Within normal limits.  REPRODUCTIVE ORGANS: Within normal limits.    BOWEL: No bowel obstruction. The appendix is normal.  PERITONEUM: Mild ascites.  VESSELS:  Within normal limits.  RETROPERITONEUM/LYMPH NODES: No lymphadenopathy.  ABDOMINAL WALL: Anasarca.  BONES: Within normal limits.    IMPRESSION: Small pleural effusions and mild ascites. Mild pulmonary edema.    No bowel obstruction.      < end of copied text >    < from: CT Head No Cont (11.15.20 @ 12:30) >    EXAM:  CT BRAIN                        PROCEDURE DATE:  11/15/2020    INTERPRETATION:  CT HEAD    History:  35-year-old with delirium. altered mental status.    Technique:  Computed tomography of the head was performed without intravenouscontrast.  Sagittal and coronal 2-D reformatted images were also obtained.    Comparison:  None.    Findings:  Brain: No acute intracranial hemorrhage or large vessel infarct is identified; the gray-white junction is intact. Focal nonspecific hypodensities in the bilateral basal ganglionic regions for age. Nonspecific hypodensities in the subcortical and periventricular white matter for age. No large mass, mass effect, or midline shift is seen. The midline structures are unremarkable. The brain demonstrates unremarkable morphology and volume for age.    Ventricles: No hydrocephalus  Extra-axial spaces: No subdural or epidural collection. The convexity sulci and basal cisterns are preserved.  Vascular, intracranial: No significant intracranialatherosclerotic changes are noted. Gas is noted within the posterior inferior superior sagittal sinus including the torcular region (series 501:47) and right transverse sinus on series 4: Image 19) likely secondary to intravenous line usage. No evidence of fracture.    Surgical Changes: None.  Calvarium: The calvarium is intact without focal osseous pathology. No significant scalp contusion identified.  Skull base: The middle ears and mastoid air cells are clear. TMJs are aligned.    Orbits/facial bones: Non dedicated views of the orbits are grossly unremarkable. No visualized fracture.  Paranasal sinuses: Moderate sized mucous retention cyst within the right maxillary sinus. The visualized paranasal sinuses are otherwise well aerated. Moderate leftward deviation of the bony nasal septum.    Impression:  Head CT without contrast  1. Nonspecific focal hypodensities in the bilateral ganglionic regions as well as in the periventricular and subcortical white matter; in the correct clinical setting, findings could represent demyelinating disease in this 35-year-old female; age-indeterminate sequelae of prior infectious or inflammatory disorders as well as diabetes, hypertension and vasculopathies are additional considerations.  2. No acuteintracranial hemorrhage, extra-axial collection, hydrocephalus, midline shift or space-occupying mass lesion.  3. Foci of gas in the dorsal dural venous sinuses, likely secondary to intravenous line usage; other intravenous sources cannot be entirelyexcluded. Follow-up may be indicated.    I discussed the significant finding in this report via telephone KENYATTA GUERRERO LEONOR on 11/15/2020 at 1:07 PM .  The findings were acknowledged and understood. This verbal communication supplements the text reportof this document.  AIME MCPHERSON MD; Attending Radiologist  This document has been electronically signed. Nov 15 2020  1:11PM    < end of copied text >

## 2020-11-16 NOTE — PROGRESS NOTE ADULT - ASSESSMENT
34 yo F with ?heart failure unknown etiology, EF ~15% as per patient a/w AMS, hypoglycemia and hypotension on 11/15/2020.  Pt noted to have improved mental status overnight and weaned off levophed this AM.  Pt received 4L fluid resuscitation yesterday with FS 50 and initial BP 80/60.  Unclear what medications patient is on, concern for possible betablocker toxicity (Coreg).  Confirming medications and PMH with other hospitals.    Neuro: AMS possibly medication related, now resolved.    CV: Bradycardia and Hypotension - resolving, remains borderline BP, patient now notes history of heart failure with ?EF 15% and was offered lifevest in August at [facility].  Pt reports no outpt cardiologist. Holding AV mathieu blocking agents at this time.  Pending echo.  Trops noted to be slightly elevated; no active chest pain at this time.    Pulm: No acute issues.    GI: Regular diet  Renal/Metabolic: LINO ? 2/2 cardiorenal syndrome Cr now downtrending.  S/p 4L in ED; will monitor I and Os, now off levophed; will consider Lasix in AM.    ID: No acute issues.   HEME - HSQ  Dispo:  Improved mental status, weaned off pressors.  Likely baseline BP low 2/2 heart failure, and noted to have mental status with MAP ~55-60s.  Follow up trops and echo today.

## 2020-11-17 LAB
A1C WITH ESTIMATED AVERAGE GLUCOSE RESULT: 6.2 % — HIGH (ref 4–5.6)
ANION GAP SERPL CALC-SCNC: 6 MMOL/L — SIGNIFICANT CHANGE UP (ref 5–17)
BUN SERPL-MCNC: 30 MG/DL — HIGH (ref 7–23)
CALCIUM SERPL-MCNC: 7.9 MG/DL — LOW (ref 8.5–10.1)
CHLORIDE SERPL-SCNC: 107 MMOL/L — SIGNIFICANT CHANGE UP (ref 96–108)
CO2 SERPL-SCNC: 26 MMOL/L — SIGNIFICANT CHANGE UP (ref 22–31)
CREAT SERPL-MCNC: 1.73 MG/DL — HIGH (ref 0.5–1.3)
ESTIMATED AVERAGE GLUCOSE: 131 MG/DL — HIGH (ref 68–114)
GLUCOSE BLDC GLUCOMTR-MCNC: 112 MG/DL — HIGH (ref 70–99)
GLUCOSE BLDC GLUCOMTR-MCNC: 120 MG/DL — HIGH (ref 70–99)
GLUCOSE BLDC GLUCOMTR-MCNC: 123 MG/DL — HIGH (ref 70–99)
GLUCOSE BLDC GLUCOMTR-MCNC: 134 MG/DL — HIGH (ref 70–99)
GLUCOSE SERPL-MCNC: 115 MG/DL — HIGH (ref 70–99)
HCT VFR BLD CALC: 37.2 % — SIGNIFICANT CHANGE UP (ref 34.5–45)
HGB BLD-MCNC: 11.3 G/DL — LOW (ref 11.5–15.5)
MAGNESIUM SERPL-MCNC: 1.8 MG/DL — SIGNIFICANT CHANGE UP (ref 1.6–2.6)
MCHC RBC-ENTMCNC: 25.1 PG — LOW (ref 27–34)
MCHC RBC-ENTMCNC: 30.4 GM/DL — LOW (ref 32–36)
MCV RBC AUTO: 82.5 FL — SIGNIFICANT CHANGE UP (ref 80–100)
NRBC # BLD: 0 /100 WBCS — SIGNIFICANT CHANGE UP (ref 0–0)
PHOSPHATE SERPL-MCNC: 3.6 MG/DL — SIGNIFICANT CHANGE UP (ref 2.5–4.5)
PLATELET # BLD AUTO: 406 K/UL — HIGH (ref 150–400)
POTASSIUM SERPL-MCNC: 3.9 MMOL/L — SIGNIFICANT CHANGE UP (ref 3.5–5.3)
POTASSIUM SERPL-SCNC: 3.9 MMOL/L — SIGNIFICANT CHANGE UP (ref 3.5–5.3)
RBC # BLD: 4.51 M/UL — SIGNIFICANT CHANGE UP (ref 3.8–5.2)
RBC # FLD: 19.4 % — HIGH (ref 10.3–14.5)
SARS-COV-2 IGG SERPL QL IA: NEGATIVE — SIGNIFICANT CHANGE UP
SARS-COV-2 IGM SERPL IA-ACNC: <0.1 INDEX — SIGNIFICANT CHANGE UP
SODIUM SERPL-SCNC: 139 MMOL/L — SIGNIFICANT CHANGE UP (ref 135–145)
TROPONIN I SERPL-MCNC: 0.09 NG/ML — HIGH (ref 0.01–0.04)
WBC # BLD: 8.49 K/UL — SIGNIFICANT CHANGE UP (ref 3.8–10.5)
WBC # FLD AUTO: 8.49 K/UL — SIGNIFICANT CHANGE UP (ref 3.8–10.5)

## 2020-11-17 PROCEDURE — 99222 1ST HOSP IP/OBS MODERATE 55: CPT

## 2020-11-17 PROCEDURE — 99233 SBSQ HOSP IP/OBS HIGH 50: CPT

## 2020-11-17 RX ORDER — ASPIRIN/CALCIUM CARB/MAGNESIUM 324 MG
81 TABLET ORAL DAILY
Refills: 0 | Status: DISCONTINUED | OUTPATIENT
Start: 2020-11-17 | End: 2020-11-19

## 2020-11-17 RX ORDER — MAGNESIUM SULFATE 500 MG/ML
2 VIAL (ML) INJECTION ONCE
Refills: 0 | Status: DISCONTINUED | OUTPATIENT
Start: 2020-11-17 | End: 2020-11-17

## 2020-11-17 RX ADMIN — Medication 81 MILLIGRAM(S): at 14:34

## 2020-11-17 RX ADMIN — HEPARIN SODIUM 5000 UNIT(S): 5000 INJECTION INTRAVENOUS; SUBCUTANEOUS at 21:55

## 2020-11-17 RX ADMIN — PIPERACILLIN AND TAZOBACTAM 25 GRAM(S): 4; .5 INJECTION, POWDER, LYOPHILIZED, FOR SOLUTION INTRAVENOUS at 21:54

## 2020-11-17 RX ADMIN — HEPARIN SODIUM 5000 UNIT(S): 5000 INJECTION INTRAVENOUS; SUBCUTANEOUS at 14:35

## 2020-11-17 RX ADMIN — CHLORHEXIDINE GLUCONATE 1 APPLICATION(S): 213 SOLUTION TOPICAL at 14:35

## 2020-11-17 RX ADMIN — PIPERACILLIN AND TAZOBACTAM 25 GRAM(S): 4; .5 INJECTION, POWDER, LYOPHILIZED, FOR SOLUTION INTRAVENOUS at 14:36

## 2020-11-17 NOTE — CONSULT NOTE ADULT - SUBJECTIVE AND OBJECTIVE BOX
CARDIOLOGY CONSULT NOTE    Patient is a 35y Female with a known history of :    HPI:  34 yo F with a PMH of chronic systolic congestive heart failure of unknown etiology, EF ~15% as per patient a/w AMS, hypoglycemia and hypotension on 11/15/2020.    Pt noted to have improved mental status overnight and weaned off levophed 11/16 AM.   Pt received 4L fluid resuscitation yesterday with FS 50 and initial BP 80/60; bradycardic.    Concern for possible betablocker toxicity (Coreg).    Patient offered lifevest in August at [facility] but refused.    TTE done here, EF to 40-45% with idiopathic cardiomyopathy.   Trops noted to be slightly elevated; no active chest pain at this time.   Refusing to talk to me currently.    REVIEW OF SYSTEMS:  unable to obtain from pt; refused to talk with me    MEDICATIONS  (STANDING):  aspirin enteric coated 81 milliGRAM(s) Oral daily  chlorhexidine 4% Liquid 1 Application(s) Topical <User Schedule>  heparin   Injectable 5000 Unit(s) SubCutaneous every 8 hours  piperacillin/tazobactam IVPB.. 3.375 Gram(s) IV Intermittent every 8 hours    MEDICATIONS  (PRN):      ALLERGIES: No Known Allergies      FAMILY HISTORY:  No pertinent family history in first degree relatives        PHYSICAL EXAMINATION:  -----------------------------  T(C): 36.6 (11-17-20 @ 16:01), Max: 36.7 (11-16-20 @ 17:12)  HR: 85 (11-17-20 @ 15:05) (0 - 87)  BP: 150/97 (11-17-20 @ 15:05) (79/56 - 157/96)  RR: 20 (11-17-20 @ 15:05) (11 - 30)  SpO2: 94% (11-17-20 @ 04:05) (92% - 100%)    Constitutional:  no acute distress.   Eyes: the conjunctiva exhibited no abnormalities and the eyelids demonstrated no xanthelasmas.   HEENT: normal oral mucosa, no oral pallor and no oral cyanosis.   Neck: normal jugular venous A waves present, normal jugular venous V waves present and no jugular venous estrada A waves.   Pulmonary: no respiratory distress, normal respiratory rhythm and effort, no accessory muscle use and lungs were clear to auscultation bilaterally.   Cardiovascular: heart rate and rhythm were normal, normal S1 and S2 and no murmur, gallop, rub, heave or thrill are present.   Abdomen: soft, non-tender, no hepato-splenomegaly and no abdominal mass palpated.   Musculoskeletal: the gait could not be assessed..   Extremities: no clubbing of the fingernails, no localized cyanosis, no petechial hemorrhages and no ischemic changes.   Skin: normal skin color and pigmentation, no rash, no venous stasis, no skin lesions, no skin ulcer and no xanthoma was observed.   Psychiatric: oriented to person, place, and time      LABS:   --------  11-17    139  |  107  |  30<H>  ----------------------------<  115<H>  3.9   |  26  |  1.73<H>    Ca    7.9<L>      17 Nov 2020 05:23  Phos  3.6     11-17  Mg     1.8     11-17    TPro  5.9<L>  /  Alb  2.3<L>  /  TBili  1.7<H>  /  DBili  x   /  AST  19  /  ALT  15  /  AlkPhos  99  11-16                         11.3   8.49  )-----------( 406      ( 17 Nov 2020 05:23 )             37.2     PT/INR - ( 16 Nov 2020 04:03 )   PT: 17.6 sec;   INR: 1.55 ratio             11-17 @ 05:23 CPK total:--, CKMB --, Troponin I - .094 ng/mL<H>  11-16 @ 20:24 CPK total:--, CKMB --, Troponin I - .097 ng/mL<H>  11-16 @ 09:56 CPK total:--, CKMB --, Troponin I - .079 ng/mL<H>      Culture Results:   <10,000 CFU/mL Normal Urogenital Sushma (11-15 @ 22:55)  Culture Results:   No growth to date. (11-15 @ 17:10)  Culture Results:   No growth to date. (11-15 @ 17:10)      RADIOLOGY:  -----------------    < from: TTE Echo Complete w/o Contrast w/ Doppler (11.16.20 @ 15:04) >  Summary:   1. Left ventricular ejection fraction, by visual estimation, is 40 to 45%.   2. Moderately decreased global left ventricular systolic function.   3. Idiopathic cardiomyopathy.   4. Mildly enlarged right atrium.   5. Mild mitral valve regurgitation.   6. Mild tricuspid regurgitation.   7. Mild pulmonic valve regurgitation.   8. Estimated pulmonary artery systolic pressure is 49.8 mmHg assuming a right atrial pressure of 15 mmHg, which is consistent with moderate pulmonary hypertension.      < end of copied text >

## 2020-11-17 NOTE — DIETITIAN INITIAL EVALUATION ADULT. - PERTINENT LABORATORY DATA
11-17 Na139 mmol/L Glu 115 mg/dL<H> K+ 3.9 mmol/L Cr  1.73 mg/dL<H> BUN 30 mg/dL<H> 11-17 Phos 3.6 mg/dL 11-16 Alb 2.3 g/dL<L>11-16 ALT 15 U/L AST 19 U/L Alkaline Phosphatase 99 U/L  11-17-20 @ 04:34 a1c 6.2<H-prediabetes range>  POCT blood Glucose range  since 11/15->11/17

## 2020-11-17 NOTE — DIETITIAN INITIAL EVALUATION ADULT. - PHYSCIAL ASSESSMENT
BMI=22.5(11/15), 11/16, 1+ generalized edema, 2+ edema of left leg noted/other (specify) Physical exam was limited due to warming blanket use and pt laying on the side.

## 2020-11-17 NOTE — DIETITIAN INITIAL EVALUATION ADULT. - PERTINENT MEDS FT
MEDICATIONS  (STANDING):  aspirin enteric coated 81 milliGRAM(s) Oral daily  chlorhexidine 4% Liquid 1 Application(s) Topical <User Schedule>  heparin   Injectable 5000 Unit(s) SubCutaneous every 8 hours  piperacillin/tazobactam IVPB.. 3.375 Gram(s) IV Intermittent every 8 hours    MEDICATIONS  (PRN):

## 2020-11-17 NOTE — PROGRESS NOTE ADULT - ASSESSMENT
36 yo F with ?heart failure unknown etiology, EF ~15% as per patient a/w AMS, hypoglycemia and hypotension on 11/15/2020.  Pt noted to have improved mental status overnight and weaned off levophed this AM.  Pt received 4L fluid resuscitation yesterday with FS 50 and initial BP 80/60.  Unclear what medications patient is on, concern for possible betablocker toxicity (Coreg).  Confirming medications and PMH with other hospitals.      Neuro: AMS possibly medication related, now resolved.    CV: Bradycardia and Hypotension - resolving, remains borderline BP, patient now notes history of heart failure with ?EF 15% and was offered lifevest in August at [facility].  Pt reports no outpt cardiologist. Holding AV mathieu blocking agents at this time.  Pending echo.  Trops noted to be slightly elevated; no active chest pain at this time.  Echo noted to have EF 40-45%.   Pulm: No acute issues.    GI: Regular diet  Renal/Metabolic: LINO ? 2/2 cardiorenal syndrome; will trend creatinine.  Appears euvolemic at this time, laying flat.  S/p 4L in ED; will monitor I and Os, now off levophed.  HEME - HSQ  Dispo:  Improved mental status, weaned off pressors. Now stable for transfer to telemetry.  Social work consult.     Attending Critical Care Time 35 minutes

## 2020-11-17 NOTE — CONSULT NOTE ADULT - ASSESSMENT
36 yo F with a PMH of chronic systolic congestive heart failure of unknown etiology, EF ~15% as per patient a/w AMS, hypoglycemia and hypotension on 11/15/2020.    Pt noted to have improved mental status overnight and weaned off levophed 11/16 AM.   Pt received 4L fluid resuscitation yesterday with FS 50 and initial BP 80/60; bradycardic.    Concern for possible betablocker toxicity (Coreg).    Patient offered lifevest in August at [facility] but refused.    TTE done here, EF to 40-45% with idiopathic cardiomyopathy.   Trops noted to be slightly elevated; no active chest pain at this time.   Refusing to talk to me currently.    -cont asa  -encourage PO; gentle IVFs as needed with creat 1.7  -likely restart bb in AM  -?compliant with further HF treatment with current depressed MS and living situation.

## 2020-11-17 NOTE — CHART NOTE - NSCHARTNOTEFT_GEN_A_CORE
34 yo F with ?heart failure unknown etiology, EF ~15% as per patient a/w AMS, hypoglycemia and hypotension on 11/15/2020.  Pt noted to have improved mental status overnight and weaned off levophed 11/16 AM. Pt received 4L fluid resuscitation yesterday with FS 50 and initial BP 80/60.  Unclear what medications patient is on, concern for possible betablocker toxicity (Coreg).  Confirming medications and PMH with other hospitals.    Neuro: AMS possibly medication related, now resolved.    CV: Bradycardia and Hypotension - resolving, remains borderline BP, patient now notes history of heart failure with ?EF 15% and was offered lifevest in August at [facility].  Pt reports no outpt cardiologist. Holding AV mathieu blocking agents at this time.  TTE done here, EF to 40-45% with idiopathic cardiomyopathy. Trops noted to be slightly elevated; no active chest pain at this time.    Pulm: No acute issues.    GI: Regular diet  Renal/Metabolic: LINO ? 2/2 cardiorenal syndrome Cr now downtrending.  S/p 4L in ED; will monitor I and Os, now off levophed; will consider Lasix in AM.    ID: No acute issues.   HEME - HSQ  Dispo:  Improved mental status, weaned off pressors.  Likely baseline BP low 2/2 heart failure, and noted to have mental status with MAP ~55-60s.      Pt stable for transfer to telemetry medical floor 36 yo F with ?heart failure unknown etiology, EF ~15% as per patient a/w AMS, hypoglycemia and hypotension on 11/15/2020.  Pt noted to have improved mental status overnight and weaned off levophed 11/16 AM. Pt received 4L fluid resuscitation yesterday with FS 50 and initial BP 80/60.  Unclear what medications patient is on, concern for possible betablocker toxicity (Coreg).  Confirming medications and PMH with other hospitals.    Neuro: AMS possibly medication related, now resolved.    CV: Bradycardia and Hypotension - resolving, remains borderline BP, patient now notes history of heart failure with ?EF 15% and was offered lifevest in August at [facility].  Pt reports no outpt cardiologist. Holding AV mathieu blocking agents at this time.  TTE done here, EF to 40-45% with idiopathic cardiomyopathy. Trops noted to be slightly elevated; no active chest pain at this time.    Pulm: No acute issues.    GI: Regular diet  Renal/Metabolic: LINO ? 2/2 cardiorenal syndrome Cr now downtrending.  S/p 4L in ED; will monitor I and Os, now off levophed; will consider Lasix in AM.    ID: No acute issues.   HEME - HSQ  Dispo:  Improved mental status, weaned off pressors.  Likely baseline BP low 2/2 heart failure, and noted to have mental status with MAP ~55-60s.      Pt stable for transfer to telemetry medical floor  Signout given to Dr Zheng, to be placed under Dr Hooks

## 2020-11-17 NOTE — DIETITIAN INITIAL EVALUATION ADULT. - OTHER INFO
Pt seen c warming blanket around her, as per RN, pt c/o being cold.  ? heart failure, low EF, cardiomyopathy noted.   Transfer plan from ICU to telemetry medical floor noted.

## 2020-11-17 NOTE — DIETITIAN INITIAL EVALUATION ADULT. - ORAL INTAKE PTA/DIET HISTORY
Pt sleepy when seen, was not able to provide diet history, reported low appetite.   Pt lived in Shelter PTA.

## 2020-11-17 NOTE — PROGRESS NOTE ADULT - SUBJECTIVE AND OBJECTIVE BOX
INTERVAL HPI/OVERNIGHT EVENTS:    TTE performed noting  1. Left ventricular ejection fraction, by visual estimation, is 40 to 45%.  2. Moderately decreased global left ventricular systolic function.  3. Idiopathic cardiomyopathy.    CENTRAL LINE: [ ] YES [x ] NO  LOCATION:       MICHELE: [ ] YES [ x] NO        A-LINE:  [ ] YES [x ] NO  LOCATION:       GLOBAL ISSUE/BEST PRACTICE:  Analgesia: N/A  Sedation: N/A  HOB elevation: yes  Stress ulcer prophylaxis: Protonix  VTE prophylaxis: HSQ  Oral Care: Chlorhexidine  Glycemic control: ISS/Lantus  Nutrition: Reg    REVIEW OF SYSTEMS:   CONSTITUTIONAL: No fever, weight loss, or fatigue  EYES: No eye pain, visual disturbances, or discharge  ENMT:  No difficulty hearing, tinnitus, vertigo; No sinus or throat pain  NECK: No pain or stiffness  RESPIRATORY: No cough, wheezing, chills or hemoptysis; + shortness of breath  CARDIOVASCULAR: + leg swelling, No chest pain, palpitations, dizziness  GASTROINTESTINAL: No abdominal or epigastric pain. No nausea, vomiting, or hematemesis; No diarrhea or constipation. No melena or hematochezia.  GENITOURINARY: No dysuria, frequency, hematuria, or incontinence  NEUROLOGICAL: No headaches, memory loss, loss of strength, numbness, or tremors  SKIN: No itching, burning, rashes, or lesions     PHYSICAL EXAM:    GENERAL: NAD, well-groomed, well-developed  HEAD:  Atraumatic, Normocephalic  NECK: Supple, No JVD, Normal thyroid  CHEST/LUNG: Clear to auscultation bilaterally; No rales, rhonchi, wheezing, or rubs  HEART: Regular rate and rhythm; No murmurs, rubs, or gallops  ABDOMEN: Soft, Nontender, Nondistended; Bowel sounds present  EXTREMITIES:  2+ Peripheral Pulses, 3+ LE edema  NERVOUS SYSTEM:  Alert & Oriented X3, Good concentration; Motor Strength 5/5 B/L upper and lower extremities; DTRs 2+ intact and symmetric  ICU Vital Signs Last 24 Hrs  T(C): 36.3 (2020 04:05), Max: 36.7 (2020 17:12)  T(F): 97.3 (2020 04:05), Max: 98 (2020 17:12)  HR: 85 (2020 07:00) (0 - 85)  BP: 101/28 (2020 07:00) (80/47 - 118/71)  BP(mean): 47 (2020 07:00) (47 - 86)  ABP: --  ABP(mean): --  RR: 17 (2020 07:00) (11 - 30)  SpO2: 94% (2020 04:05) (90% - 100%)      I&O's Detail    2020 07:01  -  2020 07:00  --------------------------------------------------------  IN:    IV PiggyBack: 100 mL    Norepinephrine: 30 mL    Oral Fluid: 750 mL  Total IN: 880 mL    OUT:    Voided (mL): 800 mL  Total OUT: 800 mL    Total NET: 80 mL          MEDICATIONS  NEURO  Meds:   RESPIRATORY  ABG - ( 15 Nov 2020 11:36 )  pH: x     /  pCO2: x     /  pO2: x     / HCO3: x     / Base Excess: x     /  SaO2: x       Lactate: 2.1              Meds:   CARDIOVASCULAR  Meds:   GI/NUTRITION  Meds:   GENITOURINARY  Meds:   HEMATOLOGIC  Meds: heparin   Injectable 5000 Unit(s) SubCutaneous every 8 hours    [x] VTE Prophylaxis  INFECTIOUS DISEASES  Meds: piperacillin/tazobactam IVPB.. 3.375 Gram(s) IV Intermittent every 8 hours    ENDOCRINE  CAPILLARY BLOOD GLUCOSE      POCT Blood Glucose.: 123 mg/dL (2020 23:59)  POCT Blood Glucose.: 79 mg/dL (2020 17:15)    Meds:   OTHER MEDICATIONS:  chlorhexidine 4% Liquid 1 Application(s) Topical <User Schedule>  :    LABS:                        11.3   8.49  )-----------( 406      ( 2020 05:23 )             37.2      17    139  |  107  |  30<H>  ----------------------------<  115<H>  3.9   |  26  |  1.73<H>    Ca    7.9<L>      2020 05:23  Phos  3.6       Mg     1.8         TPro  5.9<L>  /  Alb  2.3<L>  /  TBili  1.7<H>  /  DBili  x   /  AST  19  /  ALT  15  /  AlkPhos  99      PT/INR - ( 2020 04:03 )   PT: 17.6 sec;   INR: 1.55 ratio    ## COVID Panel  Troponin I, Serum: .094 ng/mL (20 @ 05:23)  Troponin I, Serum: .097 ng/mL (20 @ 20:24)  Troponin I, Serum: .079 ng/mL (20 @ 09:56)  COVID-19 PCR: NotDetec (11-15-20 @ 16:22)    Urinalysis Basic - ( 15 Nov 2020 19:30 )    Color: Yellow / Appearance: Clear / S.010 / pH: x  Gluc: x / Ketone: Negative  / Bili: Negative / Urobili: Negative mg/dL   Blood: x / Protein: 100 mg/dL / Nitrite: Negative   Leuk Esterase: Negative / RBC: x / WBC x   Sq Epi: x / Non Sq Epi: Moderate / Bacteria: Few      Culture Results:   <10,000 CFU/mL Normal Urogenital Sushma (11-15 @ 22:55)  Culture Results:   No growth to date. (11-15 @ 17:10)  Culture Results:   No growth to date. (11-15 @ 17:10)    RADIOLOGY & ADDITIONAL STUDIES:    < from: TTE Echo Complete w/o Contrast w/ Doppler (20 @ 15:04) >   EXAM:  ECHO TTE WO CON COMP W DOPP         PROCEDURE DATE:  2020        INTERPRETATION:  REPORT:  TRANSTHORACIC ECHOCARDIOGRAM REPORT        Patient Name:   CLARE SANTANA Patient Location: [facility] Rec #:  [id number]      Accession #:  [id number]  Account #:                      Height:           65.0 in 165.0 cm  YOB: 1985       Weight:           134.9 lb 61.20 kg  Patient Age:    35 years        BSA:              1.67 m²  Patient Gender: F               BP:       86/56 mmHg      Date of Exam:        2020 3:04:33 PM  Sonographer:         JOEY  Referring Physician: ARLINE    Procedure:     2D Echo/Doppler/Color Doppler Complete.  Indications:   Cardiomyopathy, unspecified - I42.9  Diagnosis:     Cardiomyopathy, unspecified - I42.9  Study Details: Technically fair study.        2D AND M-MODE MEASUREMENTS (normal ranges within parentheses):  Left Ventricle:                  Normal   Aorta/Left Atrium:          Normal  IVSd (2D):              0.89 cm (0.7-1.1) Aortic Root (2D):  2.96 cm (2.4-3.7)  LVPWd (2D):             0.73 cm (0.7-1.1) Left Atrium (2D):  3.21 cm (1.9-4.0)  LVIDd (2D):             5.13 cm (3.4-5.7) Right Ventricle:  LVIDs (2D):             4.31 cm           TAPSE:           1.28cm  LV FS (2D):             16.0 %   (>25%)  Relative Wall Thickness  0.28    (<0.42)    LV DIASTOLIC FUNCTION:  MV Peak E: 0.74 m/s Decel Time:  184 msec  MV Peak A: 0.72 m/s Septal E/e'  16.5  E/A Ratio: 1.03     Lateral E/e' 11.8  Septal e'  0.0 m/s  Lateral e' 0.1 m/s    SPECTRAL DOPPLER ANALYSIS (where applicable):  Mitral Valve:  MV P1/2 Time: 53.48 msec  MV Area, PHT: 4.11 cm²    Aortic Valve: AoV Max Nelson: 1.25 m/s AoV Peak P.3 mmHg AoV Mean PG: 3.4 mmHg    LVOT Vmax: 1.01 m/s LVOT VTI: 0.131 m LVOT Diameter: 2.06 cm    AoV Area, Vmax: 2.68 cm² AoV Area, VTI: 2.41 cm² AoV Area, Vmn: 2.10 cm²    Tricuspid Valve and PA/RV Systolic Pressure: TR Max Velocity: 2.95 m/s RA Pressure: 15 mmHg RVSP/PASP: 49.8 mmHg      PHYSICIAN INTERPRETATION:  Left Ventricle: The left ventricular internal cavity size is normal. Left ventricular wall thickness is normal.  Global LV systolic function was moderately decreased. Left ventricular ejection fraction, by visual estimation, is 40 to 45%. The interventricular septum is flattened in systole and diastole, consistent with right ventricular pressure and volume overload. Findings are consistent with idiopathic cardiomyopathy.  Right Ventricle: Normal right ventricular size and function.  Left Atrium: The left atrium is normal in size.  Right Atrium: Mildly enlarged right atrium.  Pericardium: There is no evidence of pericardial effusion.  Mitral Valve: Structurally normal mitral valve, with normal leaflet excursion. Mild mitral valve regurgitation is seen.  Tricuspid Valve: Structurally normal tricuspid valve, with normal leaflet excursion. Mild tricuspid regurgitation is visualized. Estimated pulmonary artery systolic pressure is 49.8 mmHg assuming a right atrial pressure of 15 mmHg, which is consistent with moderate pulmonary hypertension.  Aortic Valve: Normal trileaflet aortic valve with normal opening. No evidence of aortic valve regurgitation is seen.  Pulmonic Valve: Structurally normal pulmonic valve, with normal leaflet excursion. Mild pulmonic valve regurgitation.  Aorta: The aortic root and ascending aorta are structurally normal, with no evidence of dilitation.  Pulmonary Artery: The main pulmonary artery is normal in size.      Summary:   1. Left ventricular ejection fraction, by visual estimation, is 40 to 45%.   2. Moderately decreased global left ventricular systolic function.   3. Idiopathic cardiomyopathy.   4. Mildly enlarged right atrium.   5. Mild mitral valve regurgitation.   6. Mild tricuspid regurgitation.   7. Mild pulmonic valve regurgitation.   8. Estimated pulmonary artery systolic pressure is 49.8 mmHg assuming a right atrial pressure of 15 mmHg, which is consistent with moderate pulmonary hypertension.    [id number] Emmett Steinberg MD, [facility]  Electronically signed on 2020 at 7:01:51 PM  *** Final ***     EMMETT STEINBERG MD; Attending Cardiologist  This document has been electronically signed. 2020  3:04PM    < end of copied text >   INTERVAL HPI/OVERNIGHT EVENTS:    TTE performed noting  1. Left ventricular ejection fraction, by visual estimation, is 40 to 45%.  2. Moderately decreased global left ventricular systolic function.  3. Idiopathic cardiomyopathy.    CENTRAL LINE: [ ] YES [x ] NO  LOCATION:       MICHELE: [ ] YES [ x] NO        A-LINE:  [ ] YES [x ] NO  LOCATION:       GLOBAL ISSUE/BEST PRACTICE:  Analgesia: N/A  Sedation: N/A  HOB elevation: yes  Stress ulcer prophylaxis: Protonix  VTE prophylaxis: HSQ  Oral Care: Chlorhexidine  Glycemic control: ISS/Lantus  Nutrition: Reg    REVIEW OF SYSTEMS:   CONSTITUTIONAL: No fever, weight loss, or fatigue  EYES: No eye pain, visual disturbances, or discharge  ENMT:  No difficulty hearing, tinnitus, vertigo; No sinus or throat pain  NECK: No pain or stiffness  RESPIRATORY: No cough, wheezing, chills or hemoptysis; + shortness of breath  CARDIOVASCULAR: + leg swelling, No chest pain, palpitations, dizziness  GASTROINTESTINAL: No abdominal or epigastric pain. No nausea, vomiting, or hematemesis; No diarrhea or constipation. No melena or hematochezia.  GENITOURINARY: No dysuria, frequency, hematuria, or incontinence  NEUROLOGICAL: No headaches, memory loss, loss of strength, numbness, or tremors  SKIN: No itching, burning, rashes, or lesions     PHYSICAL EXAM:    GENERAL: NAD, well-groomed, well-developed  HEAD:  Atraumatic, Normocephalic  NECK: Supple, No JVD, Normal thyroid  CHEST/LUNG: Clear to auscultation bilaterally; No rales, rhonchi, wheezing, or rubs  HEART: Regular rate and rhythm; No murmurs, rubs, or gallops  ABDOMEN: Soft, Nontender, Nondistended; Bowel sounds present  EXTREMITIES:  2+ Peripheral Pulses, 3+ LE edema  NERVOUS SYSTEM:  Alert & Oriented X3, Good concentration; Motor Strength 5/5 B/L upper and lower extremities; DTRs 2+ intact and symmetric  ICU Vital Signs Last 24 Hrs  T(C): 36.3 (2020 04:05), Max: 36.7 (2020 17:12)  T(F): 97.3 (2020 04:05), Max: 98 (2020 17:12)  HR: 85 (2020 07:00) (0 - 85)  BP: 101/28 (2020 07:00) (80/47 - 118/71)  BP(mean): 47 (2020 07:00) (47 - 86)  ABP: --  ABP(mean): --  RR: 17 (2020 07:00) (11 - 30)  SpO2: 94% (2020 04:05) (90% - 100%)      I&O's Detail    2020 07:01  -  2020 07:00  --------------------------------------------------------  IN:    IV PiggyBack: 100 mL    Norepinephrine: 30 mL    Oral Fluid: 750 mL  Total IN: 880 mL    OUT:    Voided (mL): 800 mL  Total OUT: 800 mL    Total NET: 80 mL  MEDICATIONS  NEURO  Meds:   RESPIRATORY  ABG - ( 15 Nov 2020 11:36 )  pH: x     /  pCO2: x     /  pO2: x     / HCO3: x     / Base Excess: x     /  SaO2: x       Lactate: 2.1    Meds:   CARDIOVASCULAR  Meds:   GI/NUTRITION  Meds:   GENITOURINARY  Meds:   HEMATOLOGIC  Meds: heparin   Injectable 5000 Unit(s) SubCutaneous every 8 hours    [x] VTE Prophylaxis  INFECTIOUS DISEASES  Meds: piperacillin/tazobactam IVPB.. 3.375 Gram(s) IV Intermittent every 8 hours    ENDOCRINE  CAPILLARY BLOOD GLUCOSE      POCT Blood Glucose.: 123 mg/dL (2020 23:59)  POCT Blood Glucose.: 79 mg/dL (2020 17:15)    Meds:   OTHER MEDICATIONS:  chlorhexidine 4% Liquid 1 Application(s) Topical <User Schedule>  :    LABS:                        11.3   8.49  )-----------( 406      ( 2020 05:23 )             37.2      17    139  |  107  |  30<H>  ----------------------------<  115<H>  3.9   |  26  |  1.73<H>    Ca    7.9<L>      2020 05:23  Phos  3.6       Mg     1.8         TPro  5.9<L>  /  Alb  2.3<L>  /  TBili  1.7<H>  /  DBili  x   /  AST  19  /  ALT  15  /  AlkPhos  99      PT/INR - ( 2020 04:03 )   PT: 17.6 sec;   INR: 1.55 ratio    ## COVID Panel  Troponin I, Serum: .094 ng/mL (20 @ 05:23)  Troponin I, Serum: .097 ng/mL (20 @ 20:24)  Troponin I, Serum: .079 ng/mL (20 @ 09:56)  COVID-19 PCR: NotDetec (11-15-20 @ 16:22)    Urinalysis Basic - ( 15 Nov 2020 19:30 )    Color: Yellow / Appearance: Clear / S.010 / pH: x  Gluc: x / Ketone: Negative  / Bili: Negative / Urobili: Negative mg/dL   Blood: x / Protein: 100 mg/dL / Nitrite: Negative   Leuk Esterase: Negative / RBC: x / WBC x   Sq Epi: x / Non Sq Epi: Moderate / Bacteria: Few      Culture Results:   <10,000 CFU/mL Normal Urogenital Sushma (11-15 @ 22:55)  Culture Results:   No growth to date. (11-15 @ 17:10)  Culture Results:   No growth to date. (11-15 @ 17:10)    RADIOLOGY & ADDITIONAL STUDIES:    < from: TTE Echo Complete w/o Contrast w/ Doppler (20 @ 15:04) >   EXAM:  ECHO TTE WO CON COMP W DOPP         PROCEDURE DATE:  2020        INTERPRETATION:  REPORT:  TRANSTHORACIC ECHOCARDIOGRAM REPORT        Patient Name:   CLARE SANTANA Patient Location: [facility] Rec #:  [id number]      Accession #:  [id number]  Account #:                      Height:           65.0 in 165.0 cm  YOB: 1985       Weight:           134.9 lb 61.20 kg  Patient Age:    35 years        BSA:              1.67 m²  Patient Gender: F               BP:       86/56 mmHg      Date of Exam:        2020 3:04:33 PM  Sonographer:         JOEY  Referring Physician: ARLINE    Procedure:     2D Echo/Doppler/Color Doppler Complete.  Indications:   Cardiomyopathy, unspecified - I42.9  Diagnosis:     Cardiomyopathy, unspecified - I42.9  Study Details: Technically fair study.        2D AND M-MODE MEASUREMENTS (normal ranges within parentheses):  Left Ventricle:                  Normal   Aorta/Left Atrium:          Normal  IVSd (2D):              0.89 cm (0.7-1.1) Aortic Root (2D):  2.96 cm (2.4-3.7)  LVPWd (2D):             0.73 cm (0.7-1.1) Left Atrium (2D):  3.21 cm (1.9-4.0)  LVIDd (2D):             5.13 cm (3.4-5.7) Right Ventricle:  LVIDs (2D):             4.31 cm           TAPSE:           1.28cm  LV FS (2D):             16.0 %   (>25%)  Relative Wall Thickness  0.28    (<0.42)    LV DIASTOLIC FUNCTION:  MV Peak E: 0.74 m/s Decel Time:  184 msec  MV Peak A: 0.72 m/s Septal E/e'  16.5  E/A Ratio: 1.03     Lateral E/e' 11.8  Septal e'  0.0 m/s  Lateral e' 0.1 m/s    SPECTRAL DOPPLER ANALYSIS (where applicable):  Mitral Valve:  MV P1/2 Time: 53.48 msec  MV Area, PHT: 4.11 cm²    Aortic Valve: AoV Max Nelson: 1.25 m/s AoV Peak P.3 mmHg AoV Mean PG: 3.4 mmHg    LVOT Vmax: 1.01 m/s LVOT VTI: 0.131 m LVOT Diameter: 2.06 cm    AoV Area, Vmax: 2.68 cm² AoV Area, VTI: 2.41 cm² AoV Area, Vmn: 2.10 cm²    Tricuspid Valve and PA/RV Systolic Pressure: TR Max Velocity: 2.95 m/s RA Pressure: 15 mmHg RVSP/PASP: 49.8 mmHg      PHYSICIAN INTERPRETATION:  Left Ventricle: The left ventricular internal cavity size is normal. Left ventricular wall thickness is normal.  Global LV systolic function was moderately decreased. Left ventricular ejection fraction, by visual estimation, is 40 to 45%. The interventricular septum is flattened in systole and diastole, consistent with right ventricular pressure and volume overload. Findings are consistent with idiopathic cardiomyopathy.  Right Ventricle: Normal right ventricular size and function.  Left Atrium: The left atrium is normal in size.  Right Atrium: Mildly enlarged right atrium.  Pericardium: There is no evidence of pericardial effusion.  Mitral Valve: Structurally normal mitral valve, with normal leaflet excursion. Mild mitral valve regurgitation is seen.  Tricuspid Valve: Structurally normal tricuspid valve, with normal leaflet excursion. Mild tricuspid regurgitation is visualized. Estimated pulmonary artery systolic pressure is 49.8 mmHg assuming a right atrial pressure of 15 mmHg, which is consistent with moderate pulmonary hypertension.  Aortic Valve: Normal trileaflet aortic valve with normal opening. No evidence of aortic valve regurgitation is seen.  Pulmonic Valve: Structurally normal pulmonic valve, with normal leaflet excursion. Mild pulmonic valve regurgitation.  Aorta: The aortic root and ascending aorta are structurally normal, with no evidence of dilitation.  Pulmonary Artery: The main pulmonary artery is normal in size.      Summary:   1. Left ventricular ejection fraction, by visual estimation, is 40 to 45%.   2. Moderately decreased global left ventricular systolic function.   3. Idiopathic cardiomyopathy.   4. Mildly enlarged right atrium.   5. Mild mitral valve regurgitation.   6. Mild tricuspid regurgitation.   7. Mild pulmonic valve regurgitation.   8. Estimated pulmonary artery systolic pressure is 49.8 mmHg assuming a right atrial pressure of 15 mmHg, which is consistent with moderate pulmonary hypertension.    [id number] Emmett Steinberg MD, [facility]  Electronically signed on 2020 at 7:01:51 PM  *** Final ***     EMMETT STEINBERG MD; Attending Cardiologist  This document has been electronically signed. 2020  3:04PM    < end of copied text >

## 2020-11-18 DIAGNOSIS — F05 DELIRIUM DUE TO KNOWN PHYSIOLOGICAL CONDITION: ICD-10-CM

## 2020-11-18 LAB
ALBUMIN SERPL ELPH-MCNC: 2.6 G/DL — LOW (ref 3.3–5)
ALP SERPL-CCNC: 112 U/L — SIGNIFICANT CHANGE UP (ref 40–120)
ALT FLD-CCNC: 19 U/L — SIGNIFICANT CHANGE UP (ref 12–78)
ANION GAP SERPL CALC-SCNC: 8 MMOL/L — SIGNIFICANT CHANGE UP (ref 5–17)
AST SERPL-CCNC: 26 U/L — SIGNIFICANT CHANGE UP (ref 15–37)
BASOPHILS # BLD AUTO: 0.09 K/UL — SIGNIFICANT CHANGE UP (ref 0–0.2)
BASOPHILS NFR BLD AUTO: 1.2 % — SIGNIFICANT CHANGE UP (ref 0–2)
BILIRUB SERPL-MCNC: 0.7 MG/DL — SIGNIFICANT CHANGE UP (ref 0.2–1.2)
BUN SERPL-MCNC: 26 MG/DL — HIGH (ref 7–23)
CALCIUM SERPL-MCNC: 8.1 MG/DL — LOW (ref 8.5–10.1)
CHLORIDE SERPL-SCNC: 107 MMOL/L — SIGNIFICANT CHANGE UP (ref 96–108)
CO2 SERPL-SCNC: 25 MMOL/L — SIGNIFICANT CHANGE UP (ref 22–31)
CREAT SERPL-MCNC: 1.27 MG/DL — SIGNIFICANT CHANGE UP (ref 0.5–1.3)
EOSINOPHIL # BLD AUTO: 0.11 K/UL — SIGNIFICANT CHANGE UP (ref 0–0.5)
EOSINOPHIL NFR BLD AUTO: 1.5 % — SIGNIFICANT CHANGE UP (ref 0–6)
GLUCOSE BLDC GLUCOMTR-MCNC: 102 MG/DL — HIGH (ref 70–99)
GLUCOSE BLDC GLUCOMTR-MCNC: 84 MG/DL — SIGNIFICANT CHANGE UP (ref 70–99)
GLUCOSE SERPL-MCNC: 78 MG/DL — SIGNIFICANT CHANGE UP (ref 70–99)
HCT VFR BLD CALC: 38.3 % — SIGNIFICANT CHANGE UP (ref 34.5–45)
HGB BLD-MCNC: 11.3 G/DL — LOW (ref 11.5–15.5)
IMM GRANULOCYTES NFR BLD AUTO: 0.4 % — SIGNIFICANT CHANGE UP (ref 0–1.5)
LACTATE SERPL-SCNC: 0.9 MMOL/L — SIGNIFICANT CHANGE UP (ref 0.7–2)
LYMPHOCYTES # BLD AUTO: 1.27 K/UL — SIGNIFICANT CHANGE UP (ref 1–3.3)
LYMPHOCYTES # BLD AUTO: 16.9 % — SIGNIFICANT CHANGE UP (ref 13–44)
MAGNESIUM SERPL-MCNC: 1.8 MG/DL — SIGNIFICANT CHANGE UP (ref 1.6–2.6)
MCHC RBC-ENTMCNC: 24.4 PG — LOW (ref 27–34)
MCHC RBC-ENTMCNC: 29.5 GM/DL — LOW (ref 32–36)
MCV RBC AUTO: 82.7 FL — SIGNIFICANT CHANGE UP (ref 80–100)
MONOCYTES # BLD AUTO: 0.9 K/UL — SIGNIFICANT CHANGE UP (ref 0–0.9)
MONOCYTES NFR BLD AUTO: 12 % — SIGNIFICANT CHANGE UP (ref 2–14)
NEUTROPHILS # BLD AUTO: 5.11 K/UL — SIGNIFICANT CHANGE UP (ref 1.8–7.4)
NEUTROPHILS NFR BLD AUTO: 68 % — SIGNIFICANT CHANGE UP (ref 43–77)
NRBC # BLD: 0 /100 WBCS — SIGNIFICANT CHANGE UP (ref 0–0)
PHOSPHATE SERPL-MCNC: 2.9 MG/DL — SIGNIFICANT CHANGE UP (ref 2.5–4.5)
PLATELET # BLD AUTO: 437 K/UL — HIGH (ref 150–400)
POTASSIUM SERPL-MCNC: 3.9 MMOL/L — SIGNIFICANT CHANGE UP (ref 3.5–5.3)
POTASSIUM SERPL-SCNC: 3.9 MMOL/L — SIGNIFICANT CHANGE UP (ref 3.5–5.3)
PROT SERPL-MCNC: 6.7 GM/DL — SIGNIFICANT CHANGE UP (ref 6–8.3)
RBC # BLD: 4.63 M/UL — SIGNIFICANT CHANGE UP (ref 3.8–5.2)
RBC # FLD: 19.7 % — HIGH (ref 10.3–14.5)
SODIUM SERPL-SCNC: 140 MMOL/L — SIGNIFICANT CHANGE UP (ref 135–145)
WBC # BLD: 7.51 K/UL — SIGNIFICANT CHANGE UP (ref 3.8–10.5)
WBC # FLD AUTO: 7.51 K/UL — SIGNIFICANT CHANGE UP (ref 3.8–10.5)

## 2020-11-18 PROCEDURE — 90792 PSYCH DIAG EVAL W/MED SRVCS: CPT

## 2020-11-18 PROCEDURE — 99232 SBSQ HOSP IP/OBS MODERATE 35: CPT

## 2020-11-18 PROCEDURE — 99233 SBSQ HOSP IP/OBS HIGH 50: CPT

## 2020-11-18 RX ORDER — AMLODIPINE BESYLATE 2.5 MG/1
5 TABLET ORAL ONCE
Refills: 0 | Status: COMPLETED | OUTPATIENT
Start: 2020-11-18 | End: 2020-11-18

## 2020-11-18 RX ORDER — HYDRALAZINE HCL 50 MG
15 TABLET ORAL ONCE
Refills: 0 | Status: COMPLETED | OUTPATIENT
Start: 2020-11-18 | End: 2020-11-18

## 2020-11-18 RX ORDER — CARVEDILOL PHOSPHATE 80 MG/1
6.25 CAPSULE, EXTENDED RELEASE ORAL EVERY 12 HOURS
Refills: 0 | Status: DISCONTINUED | OUTPATIENT
Start: 2020-11-19 | End: 2020-11-19

## 2020-11-18 RX ORDER — DILTIAZEM HCL 120 MG
10 CAPSULE, EXT RELEASE 24 HR ORAL ONCE
Refills: 0 | Status: DISCONTINUED | OUTPATIENT
Start: 2020-11-18 | End: 2020-11-18

## 2020-11-18 RX ORDER — HYDRALAZINE HCL 50 MG
10 TABLET ORAL ONCE
Refills: 0 | Status: DISCONTINUED | OUTPATIENT
Start: 2020-11-18 | End: 2020-11-18

## 2020-11-18 RX ORDER — AMLODIPINE BESYLATE 2.5 MG/1
5 TABLET ORAL DAILY
Refills: 0 | Status: DISCONTINUED | OUTPATIENT
Start: 2020-11-19 | End: 2020-11-19

## 2020-11-18 RX ORDER — CARVEDILOL PHOSPHATE 80 MG/1
3.12 CAPSULE, EXTENDED RELEASE ORAL EVERY 12 HOURS
Refills: 0 | Status: DISCONTINUED | OUTPATIENT
Start: 2020-11-18 | End: 2020-11-18

## 2020-11-18 RX ORDER — IPRATROPIUM/ALBUTEROL SULFATE 18-103MCG
3 AEROSOL WITH ADAPTER (GRAM) INHALATION EVERY 6 HOURS
Refills: 0 | Status: DISCONTINUED | OUTPATIENT
Start: 2020-11-18 | End: 2020-11-19

## 2020-11-18 RX ORDER — CHLORHEXIDINE GLUCONATE 213 G/1000ML
1 SOLUTION TOPICAL DAILY
Refills: 0 | Status: DISCONTINUED | OUTPATIENT
Start: 2020-11-18 | End: 2020-11-19

## 2020-11-18 RX ORDER — ACETAMINOPHEN 500 MG
650 TABLET ORAL EVERY 6 HOURS
Refills: 0 | Status: DISCONTINUED | OUTPATIENT
Start: 2020-11-18 | End: 2020-11-19

## 2020-11-18 RX ORDER — MAGNESIUM SULFATE 500 MG/ML
1 VIAL (ML) INJECTION ONCE
Refills: 0 | Status: COMPLETED | OUTPATIENT
Start: 2020-11-18 | End: 2020-11-18

## 2020-11-18 RX ORDER — ALPRAZOLAM 0.25 MG
0.12 TABLET ORAL ONCE
Refills: 0 | Status: DISCONTINUED | OUTPATIENT
Start: 2020-11-18 | End: 2020-11-19

## 2020-11-18 RX ORDER — METOPROLOL TARTRATE 50 MG
5 TABLET ORAL ONCE
Refills: 0 | Status: COMPLETED | OUTPATIENT
Start: 2020-11-18 | End: 2020-11-18

## 2020-11-18 RX ORDER — CARVEDILOL PHOSPHATE 80 MG/1
3.12 CAPSULE, EXTENDED RELEASE ORAL ONCE
Refills: 0 | Status: COMPLETED | OUTPATIENT
Start: 2020-11-18 | End: 2020-11-18

## 2020-11-18 RX ORDER — AMLODIPINE BESYLATE 2.5 MG/1
2.5 TABLET ORAL DAILY
Refills: 0 | Status: DISCONTINUED | OUTPATIENT
Start: 2020-11-18 | End: 2020-11-18

## 2020-11-18 RX ADMIN — Medication 650 MILLIGRAM(S): at 23:26

## 2020-11-18 RX ADMIN — Medication 15 MILLIGRAM(S): at 03:50

## 2020-11-18 RX ADMIN — PIPERACILLIN AND TAZOBACTAM 25 GRAM(S): 4; .5 INJECTION, POWDER, LYOPHILIZED, FOR SOLUTION INTRAVENOUS at 06:34

## 2020-11-18 RX ADMIN — HEPARIN SODIUM 5000 UNIT(S): 5000 INJECTION INTRAVENOUS; SUBCUTANEOUS at 21:03

## 2020-11-18 RX ADMIN — Medication 5 MILLIGRAM(S): at 01:31

## 2020-11-18 RX ADMIN — AMLODIPINE BESYLATE 2.5 MILLIGRAM(S): 2.5 TABLET ORAL at 17:51

## 2020-11-18 RX ADMIN — HEPARIN SODIUM 5000 UNIT(S): 5000 INJECTION INTRAVENOUS; SUBCUTANEOUS at 14:00

## 2020-11-18 RX ADMIN — AMLODIPINE BESYLATE 5 MILLIGRAM(S): 2.5 TABLET ORAL at 22:15

## 2020-11-18 RX ADMIN — CARVEDILOL PHOSPHATE 3.12 MILLIGRAM(S): 80 CAPSULE, EXTENDED RELEASE ORAL at 17:51

## 2020-11-18 NOTE — BEHAVIORAL HEALTH ASSESSMENT NOTE - SUMMARY
Unable to determine at this time whether Patient has capacity and/or she is malingering given that she has refused to engage with Writer on 2 separate occasions today.   - PSYCKES shows many prior ED, hospital and clinic visits for medical issues in last several years including multiple visits to Cardiology  - no significant psych history as per PSYCKES: indicated THC and nicotine use, some behavioral issues/likely Personality component and general vague diagnosis like Adjustment Disorder; no record of a prior inpatient psychiatric admission in this database

## 2020-11-18 NOTE — PROGRESS NOTE ADULT - ASSESSMENT
34 yo F with a PMH of chronic systolic congestive heart failure of unknown etiology,  a/w AMS, hypoglycemia and hypotension on 11/15/2020.   Pt received 4L fluid resuscitation in ED. Concern for possible betablocker toxicity (Coreg).   Pt noted to have improved mental status overnight and weaned off levophed 11/16 AM.      ?EF 15% and was offered lifevest in August at [facility] but refused    TTE done here, EF to 40-45% with idiopathic cardiomyopathy, mild MR/TR/NV, mod pHTN  Trops noted to be slightly elevated; no active chest pain at this time.   Refusing to open her eyes and talk at this time, pushing away when requested to open her eyes.    -cont asa  -s/p IVF creat 1.7-> 1.27  -likely restart bb now   -?compliant with further HF treatment with current MS and living situation, consider psych eval   36 yo F with a PMH of chronic systolic congestive heart failure of unknown etiology,  a/w AMS, hypoglycemia and hypotension on 11/15/2020.   Pt received 4L fluid resuscitation in ED. Concern for possible betablocker toxicity (Coreg).   Pt noted to have improved mental status overnight and weaned off levophed 11/16 AM.      ?EF 15% and was offered lifevest in August at [facility] but refused    TTE done here, EF to 40-45% with idiopathic cardiomyopathy, mild MR/TR/VA, mod pHTN  Trops noted to be slightly elevated; no active chest pain at this time.   Refusing to open her eyes and talk at this time, pushing away when requested to open her eyes.    Plan  -cont asa  -s/p IVF creat 1.7-> 1.27  -restart  low dose bb now, and add ACE-I/ARB if creat remains stable   -?compliant with further HF treatment with current MS and living situation, consider psych/SW eval

## 2020-11-18 NOTE — CHART NOTE - NSCHARTNOTEFT_GEN_A_CORE
RRT called on a patient who is non compliant and refused to take the BP meds for BP of 175 /120 . This is a 36 yo F with a PMH of chronic systolic congestive heart failure of unknown etiology, EF ~15% as per patient a/w AMS, hypoglycemia and hypotension on possibly due to beta blocker overdose . Was on Levophed .  Pt received 4L fluid resuscitation yesterday with FS 50 and initial BP 80/60; bradycardic.  Concern for possible betablocker toxicity (Coreg).    Patient offered lifevest in August at [facility] but refused.  TTE done here, EF to 40-45% with idiopathic cardiomyopathy. Pt was explained of risk Vs benefits by me , including possible stroke , death and paralysis . VS at present was 175/121, 77, 16, Pt reefused physical assessment . Pt still refusing medication and states , " you are doping me up with medications , I am not going to allow you to pumb me with any more medicaion ". Rapid response called to further assistance in deciding if pt has capacity to make decision in spite of her being alert and oriented in the setting of non compliant . At present Dr Hooks at bedside explained risk Vs benefits and she agrees to take the meds . Pt finally agrees to take hydralazine 15 mg IVP x 1. Will continue to monitor .

## 2020-11-18 NOTE — CHART NOTE - NSCHARTNOTEFT_GEN_A_CORE
ACP team PA Note    BP- 172/112,. s/p Coreg 6.25 and Norvasc 2.5 mg at 17:00. pt denies any HA/dizziness/N/V/ CP/other complaints.     Vital Signs Last 24 Hrs  T(C): 36.4 (18 Nov 2020 11:07), Max: 36.6 (18 Nov 2020 00:15)  T(F): 97.6 (18 Nov 2020 11:07), Max: 97.9 (18 Nov 2020 00:15)  HR: 86 (18 Nov 2020 19:10) (77 - 88)  BP: 172/122 (18 Nov 2020 19:10) (135/96 - 175/121)  BP(mean): --  RR: 18 (18 Nov 2020 15:49) (16 - 19)  SpO2: 97% (18 Nov 2020 15:49) (93% - 97%)    Increased Norvasc to 5 mg daily, continue monitoring

## 2020-11-18 NOTE — BEHAVIORAL HEALTH ASSESSMENT NOTE - HPI (INCLUDE ILLNESS QUALITY, SEVERITY, DURATION, TIMING, CONTEXT, MODIFYING FACTORS, ASSOCIATED SIGNS AND SYMPTOMS)
36yo single, mother of two children ages 3 & 5, domiciled at a women' shelter at this time, CPS has been involved and currently have the 2 children, BIB EMS with AMS, hypoglycemia (FS50), hypotension (BP 80/60) on scene, noted to still have a [facility] bracelet on her wrist, with Abd seems distended, lower abd midline vertical scar. Patient was noted to be disheveled. UTOX negative. Admitted to ICU. Work up showed left EF 40 to 45%, Moderately decreased global left ventricular systolic function & Idiopathic cardiomyopathy and Patient reported that she was previously offered  LifeVest in August '20 at [facility]. Concern for possible betablocker toxicity (Coreg); Patient refused to talk with Cardiologist as per consult note then later was refusing BP medications etc and needed staff intervention.     ACS worker Ms. Brown (?) at [phone]; , Ms Arita at [phone]   CVM: no record found   ISTOP Reference #: [id number] no record found 34yo single, mother of two children ages 3 & 5, domiciled at a women' shelter at this time, CPS has been involved and currently have the 2 children, BIB EMS with AMS, hypoglycemia (FS50), hypotension (BP 80/60) on scene, noted to still have a [facility] bracelet on her wrist, with Abd seems distended, lower abd midline vertical scar. Patient was noted to be disheveled. UTOX negative. Admitted to ICU. Work up showed left EF 40 to 45%, Moderately decreased global left ventricular systolic function & Idiopathic cardiomyopathy and Patient reported that she was previously offered  LifeVest in  at [facility]. Concern for possible betablocker toxicity (Coreg); Patient refused to talk with Cardiologist as per consult note then later was refusing BP medications etc and needed staff intervention.     EXAM: Patient is lying in bed, eyes closed but not in deep sleep as she easily stirs and seems to hear what is being said to her. Patient keeps her eyes closed, says "ahmmm" and does not engage. Patient nods "yes" when asked if she would like to stop the interview and continue napping. Writer returned one hour later and found the same situation. Patient refused to engage.     PSYCKES: Patient with exact , SS#/insurance info, [place] address, medical history and first name was found with last name "Tito". Cannabis related disorders; Adjustment Disorder; Conduct Disorder; Tobacco related disorder; unspecified Anxiety Disorder; was on Zoloft, Atarax; Amitriptyline. Last attended [facility] on 20 for abdominal pain. Saw Card[facility], [facility] on 20. MANY hospital and clinic visits in the last 3 years for medical issues     ACS worker Ms. Brown (?) at [phone]; , Ms Arita at [phone]   CVM: no record found (both last names searched)   ISTOP Reference #: [id number] no record found (both names searched)

## 2020-11-18 NOTE — BEHAVIORAL HEALTH ASSESSMENT NOTE - NS ED BHA CANNABIS
Bedside report completed with Cam Loots. Plan of care reviewed with patient, verbalized understanding. Care assumed. Yes

## 2020-11-18 NOTE — PROGRESS NOTE ADULT - SUBJECTIVE AND OBJECTIVE BOX
Patient is a 35y old  Female who presents with a chief complaint of AMS (17 Nov 2020 14:14)    PAST MEDICAL & SURGICAL HISTORY:  Hypertension  cardiomyopathy   H/O abdominal surgery    INTERVAL HISTORY: Resting in bed, in no distress, wouldn't open her eyes, pushing away when requested to open her eyes  	  MEDICATIONS:  MEDICATIONS  (STANDING):  aspirin enteric coated 81 milliGRAM(s) Oral daily  chlorhexidine 4% Liquid 1 Application(s) Topical <User Schedule>  heparin   Injectable 5000 Unit(s) SubCutaneous every 8 hours    Vitals:  T(F): 97.6 (11-18-20 @ 06:30), Max: 97.9 (11-17-20 @ 16:01)  HR: 85 (11-18-20 @ 06:30) (77 - 88)  BP: 139/91 (11-18-20 @ 06:30) (139/91 - 175/121)  RR: 17 (11-18-20 @ 06:30) (16 - 22)  SpO2: 95% (11-18-20 @ 06:30) (95% - 98%)    11-17 @ 07:01  -  11-18 @ 07:00  --------------------------------------------------------  IN:    Oral Fluid: 240 mL  Total IN: 240 mL    OUT:  Total OUT: 0 mL    Total NET: 240 mL    Weight (kg): 61.2 (11-15 @ 10:33)  BMI (kg/m2): 22.5 (11-15 @ 10:33)    PHYSICAL EXAM:  Neuro: Awake, responsive  CV: S1 S2 RRR  Lungs: diminished to bases   GI: Soft, BS +, ND, NT  Extremities: No edema    TELEMETRY: RSR    RADIOLOGY:   < from: CT Abdomen and Pelvis w/ IV Cont (11.15.20 @ 12:30) >  IMPRESSION: Small pleural effusions and mild ascites. Mild pulmonary edema.    No bowel obstruction.    < end of copied text >    DIAGNOSTIC TESTING:    [x ] Echocardiogram: < from: TTE Echo Complete w/o Contrast w/ Doppler (11.16.20 @ 15:04) >  PHYSICIAN INTERPRETATION:  Left Ventricle: The left ventricular internal cavity size is normal. Left ventricular wall thickness is normal.  Global LV systolic function was moderately decreased. Left ventricular ejection fraction, by visual estimation, is 40 to 45%. The interventricular septum is flattened in systole and diastole, consistent with right ventricular pressure and volume overload. Findings are consistent with idiopathic cardiomyopathy.  Right Ventricle: Normal right ventricular size and function.  Left Atrium: The left atrium is normal in size.  Right Atrium: Mildly enlarged right atrium.  Pericardium: There is no evidence of pericardial effusion.  Mitral Valve: Structurally normal mitral valve, with normal leaflet excursion. Mild mitral valve regurgitation is seen.  Tricuspid Valve: Structurally normal tricuspid valve, with normal leaflet excursion. Mild tricuspid regurgitation is visualized. Estimated pulmonary artery systolic pressure is 49.8 mmHg assuming a right atrial pressure of 15 mmHg, which is consistent with moderate pulmonary hypertension.  Aortic Valve: Normal trileaflet aortic valve with normal opening. No evidence of aortic valve regurgitation is seen.  Pulmonic Valve: Structurally normal pulmonic valve, with normal leaflet excursion. Mild pulmonic valve regurgitation.  Aorta: The aortic root and ascending aorta are structurally normal, with no evidence of dilitation.  Pulmonary Artery: The main pulmonary artery is normal in size.      Summary:   1. Left ventricular ejection fraction, by visual estimation, is 40 to 45%.   2. Moderately decreased global left ventricular systolic function.   3. Idiopathic cardiomyopathy.   4. Mildly enlarged right atrium.   5. Mild mitral valve regurgitation.   6. Mild tricuspid regurgitation.   7. Mild pulmonic valve regurgitation.   8. Estimated pulmonary artery systolic pressure is 49.8 mmHg assuming a right atrial pressure of 15 mmHg, which is consistent with moderate pulmonary hypertension.    < end of copied text >    LABS:	 	    CARDIAC MARKERS:  Troponin I, Serum: .094 ng/mL (11-17 @ 05:23)  Troponin I, Serum: .097 ng/mL (11-16 @ 20:24)  Troponin I, Serum: .079 ng/mL (11-16 @ 09:56)    18 Nov 2020 08:12    140    |  107    |  26     ----------------------------<  78     3.9     |  25     |  1.27   17 Nov 2020 05:23    139    |  107    |  30     ----------------------------<  115    3.9     |  26     |  1.73   16 Nov 2020 04:03    137    |  107    |  31     ----------------------------<  180    4.0     |  21     |  1.32     Ca    8.1        18 Nov 2020 08:12  Phos  2.9       18 Nov 2020 08:12  Mg     1.8       18 Nov 2020 08:12    TPro  6.7    /  Alb  2.6    /  TBili  0.7    /  DBili  x      /  AST  26     /  ALT  19     /  AlkPhos  112    18 Nov 2020 08:12                    11.3   7.51  )-----------( 437      ( 18 Nov 2020 08:12 )             38.3 ,                       11.3   8.49  )-----------( 406      ( 17 Nov 2020 05:23 )             37.2 ,                       12.5   8.88  )-----------( 428      ( 16 Nov 2020 04:03 )             40.9 ,                       11.2   11.33 )-----------( 385      ( 15 Nov 2020 11:43 )             38.0   TSH: Thyroid Stimulating Hormone, Serum: 2.990 uIU/mL (11-16 @ 04:03)    INR: 1.55 ratio (11-16 @ 04:03)

## 2020-11-18 NOTE — PROGRESS NOTE ADULT - SUBJECTIVE AND OBJECTIVE BOX
CHIEF COMPLAINT: nonverbal so limited hx  per nurse. refusing meds off and on  no vomiting or diarrhea or active gross bleeding or fever reported.       PHYSICAL EXAM:    GENERAL: Moderately built. no acute distress but non verbal.   CHEST/LUNG: Clear to ausculation bilaterally, no wheezing, no crackles   HEART: Regular rate and rhythm; No murmurs, rubs  ABDOMEN: Soft, Nontender, Nondistended; Bowel sounds present  EXTREMITIES:  Did not follow commands, No clubbing, cyanosis, or edema  NERVOUS SYSTEM:  Limited as did not follow commands.   Psychiatry: Alert and awake. orientation could not be assessed.       OBJECTIVE DATA:   Vital Signs Last 24 Hrs  T(C): 36.4 (2020 11:07), Max: 36.6 (2020 16:01)  T(F): 97.6 (2020 11:07), Max: 97.9 (2020 16:01)  HR: 80 (2020 11:07) (77 - 88)  BP: 135/96 (2020 11:07) (135/96 - 175/121)  BP(mean): 109 (2020 15:05) (109 - 109)  RR: 18 (2020 11:07) (16 - 20)  SpO2: 93% (2020 11:07) (93% - 98%)           Daily     Daily Weight in k.5 (2020 06:30)  LABS:                        11.3   7.51  )-----------( 437      ( 2020 08:12 )             38.3             11-18    140  |  107  |  26<H>  ----------------------------<  78  3.9   |  25  |  1.27    Ca    8.1<L>      2020 08:12  Phos  2.9     11-18  Mg     1.8     -18    TPro  6.7  /  Alb  2.6<L>  /  TBili  0.7  /  DBili  x   /  AST  26  /  ALT  19  /  AlkPhos  112  11-18                    CARDIAC MARKERS ( 2020 05:23 )  .094 ng/mL / x     / x     / x     / x      CARDIAC MARKERS ( 2020 20:24 )  .097 ng/mL / x     / x     / x     / x          CAPILLARY BLOOD GLUCOSE      POCT Blood Glucose.: 84 mg/dL (2020 06:32)      Culture - Urine  Source: .Urine Clean Catch (Midstream)  Final Report ():    <10,000 CFU/mL Normal Urogenital Sushma    Culture - Blood  Source: .Blood Blood-Peripheral  Preliminary Report ():    No growth to date.    Culture - Blood  Source: .Blood Blood-Peripheral  Preliminary Report ():    No growth to date.      MEDICATIONS  (STANDING):  aspirin enteric coated 81 milliGRAM(s) Oral daily  carvedilol 3.125 milliGRAM(s) Oral every 12 hours  chlorhexidine 4% Liquid 1 Application(s) Topical daily  heparin   Injectable 5000 Unit(s) SubCutaneous every 8 hours    MEDICATIONS  (PRN):

## 2020-11-18 NOTE — BEHAVIORAL HEALTH ASSESSMENT NOTE - NSBHCHARTREVIEWLAB_PSY_A_CORE FT
11-18    140  |  107  |  26<H>  ----------------------------<  78  3.9   |  25  |  1.27    Ca    8.1<L>      18 Nov 2020 08:12  Phos  2.9     11-18  Mg     1.8     11-18    TPro  6.7  /  Alb  2.6<L>  /  TBili  0.7  /  DBili  x   /  AST  26  /  ALT  19  /  AlkPhos  112  11-18

## 2020-11-18 NOTE — BEHAVIORAL HEALTH ASSESSMENT NOTE - NSBHCHARTREVIEWVS_PSY_A_CORE FT
T(C): 36.4 (11-18-20 @ 11:07), Max: 36.6 (11-17-20 @ 16:01)  HR: 80 (11-18-20 @ 11:07) (77 - 88)  BP: 135/96 (11-18-20 @ 11:07) (135/96 - 175/121)  RR: 18 (11-18-20 @ 11:07) (16 - 20)  SpO2: 93% (11-18-20 @ 11:07) (93% - 98%)

## 2020-11-18 NOTE — BEHAVIORAL HEALTH ASSESSMENT NOTE - OTHER
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

## 2020-11-19 VITALS — SYSTOLIC BLOOD PRESSURE: 161 MMHG | DIASTOLIC BLOOD PRESSURE: 114 MMHG | HEART RATE: 83 BPM

## 2020-11-19 DIAGNOSIS — R41.0 DISORIENTATION, UNSPECIFIED: ICD-10-CM

## 2020-11-19 PROCEDURE — 99239 HOSP IP/OBS DSCHRG MGMT >30: CPT

## 2020-11-19 PROCEDURE — 99231 SBSQ HOSP IP/OBS SF/LOW 25: CPT

## 2020-11-19 RX ORDER — ASPIRIN/CALCIUM CARB/MAGNESIUM 324 MG
1 TABLET ORAL
Qty: 30 | Refills: 0
Start: 2020-11-19

## 2020-11-19 RX ORDER — AMLODIPINE BESYLATE 2.5 MG/1
1 TABLET ORAL
Qty: 30 | Refills: 0
Start: 2020-11-19

## 2020-11-19 RX ORDER — CARVEDILOL PHOSPHATE 80 MG/1
1 CAPSULE, EXTENDED RELEASE ORAL
Qty: 60 | Refills: 0
Start: 2020-11-19

## 2020-11-19 RX ORDER — LOSARTAN POTASSIUM 100 MG/1
1 TABLET, FILM COATED ORAL
Qty: 30 | Refills: 0
Start: 2020-11-19

## 2020-11-19 RX ORDER — LOSARTAN POTASSIUM 100 MG/1
25 TABLET, FILM COATED ORAL DAILY
Refills: 0 | Status: DISCONTINUED | OUTPATIENT
Start: 2020-11-19 | End: 2020-11-19

## 2020-11-19 RX ADMIN — CARVEDILOL PHOSPHATE 6.25 MILLIGRAM(S): 80 CAPSULE, EXTENDED RELEASE ORAL at 10:22

## 2020-11-19 RX ADMIN — AMLODIPINE BESYLATE 5 MILLIGRAM(S): 2.5 TABLET ORAL at 10:22

## 2020-11-19 RX ADMIN — Medication 650 MILLIGRAM(S): at 00:11

## 2020-11-19 RX ADMIN — LOSARTAN POTASSIUM 25 MILLIGRAM(S): 100 TABLET, FILM COATED ORAL at 10:20
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
## 2020-11-19 NOTE — DISCHARGE NOTE PROVIDER - NSDCFUADDINST_GEN_ALL_CORE_FT
It is important to see your primary physician as well as other necessary consultants within the next week to perform a comprehensive medical review.  Call their offices for an appointment as soon as you leave the hospital.  If you do not have a primary physician or cant reach him, contact the [facility] Physician Referral Service at [phone].  Your medical issues appear to be stable at this time, but if your symptoms recur or worsen, contact your physicians and/or return to the hospital if necessary.  If you encounter any issues or questions with your medication, call your physicians before stopping the medication.     2) Strongly recommended to have follow up with cardiologist also.

## 2020-11-19 NOTE — CHART NOTE - NSCHARTNOTEFT_GEN_A_CORE
========================  COLLATERAL   ========================  NAME: Ms. Brown   NUMBER: [phone]  RELATIONSHIP: ACS Worker  COMMENTS: Daily Contact     HPI:    Jyoti contacted Ms. Brown to inform of patients discharge today. Ms. Brown notes that she will follow up with patient in the community upon her discharge from the hospital.

## 2020-11-19 NOTE — PROGRESS NOTE ADULT - SUBJECTIVE AND OBJECTIVE BOX
Pt refused to be seen and assessed at this time, stating "I have no time to talk to you, why don't you call the people who brought me here" Pt refused to be seen and assessed at this time, stating "I have no time to talk to you, why don't you call the people who brought me here". Explained to pt about the importance of taking her meds to control her BP and strengthen her heart, also explained to her the consequences of untreated BP including stroke and death. Pt insisted that she is not interested in having a conversation and asking to leave her alone.   Psych/SW follow up

## 2020-11-19 NOTE — DISCHARGE NOTE PROVIDER - CARE PROVIDER_API CALL
Your PCP in a week,   Phone: (   )    -  Fax: (   )    -  Follow Up Time:     Chucho Jesus  CARDIOLOGY  [address] [place] [address], [place] [address]  Phone: [phone]  Fax: [phone]  Follow Up Time: 2 weeks

## 2020-11-19 NOTE — PROGRESS NOTE BEHAVIORAL HEALTH - NSBHFUPINTERVALHXFT_PSY_A_CORE
Patient was giving staff a hard time earlier today about taking medications which likely involved being woken up. Patient better - awake, alert, engages, reports that she feels better since admission though cannot recall exactly how /why she got to [facility]. Patient ate lunch, slept well and has no complaints. Patient is asking about her children and looking forward to being reunited with them. Endorses stable euthymic mood an denies any symptoms of hypomania/teo/psychosis/major depression/ anxiety/panic. Denies any active or passive suicidal or homicidal ideation. Names protective factors (her kids, lilian; family; hope for future). Denies substance use though it is highly suspected that Patient uses (also has charted hx of THC use). Denies access to guns.

## 2020-11-19 NOTE — PROGRESS NOTE BEHAVIORAL HEALTH - SUMMARY
Patient at this time has capacity to make her medical decisions  including engaging in discharge planning  - CPS notified of discharge; Patient aware CPS has her 2 kids and has their contact info  - shelter confirms Patient and her children can return

## 2020-11-19 NOTE — PROGRESS NOTE BEHAVIORAL HEALTH - OTHER
looks older than stated age, dry lined skin adequate hair uncombed, smeared remains of make up fair to low end of fair appropriate during exam deferred at this time; sitting on edge of bed "better" looks tired

## 2020-11-19 NOTE — DISCHARGE NOTE PROVIDER - NSDCMRMEDTOKEN_GEN_ALL_CORE_FT
amLODIPine 5 mg oral tablet: 1 tab(s) orally once a day  aspirin 81 mg oral delayed release tablet: 1 tab(s) orally once a day  carvedilol 6.25 mg oral tablet: 1 tab(s) orally every 12 hours  losartan 25 mg oral tablet: 1 tab(s) orally once a day

## 2020-11-19 NOTE — DISCHARGE NOTE NURSING/CASE MANAGEMENT/SOCIAL WORK - PATIENT PORTAL LINK FT
You can access the FollowMyHealth Patient Portal offered by [facility] by registering at the following website: [email or web address]. By joining [facility]’s FollowMyHealth portal, you will also be able to view your health information using other applications (apps) compatible with our system.

## 2020-11-19 NOTE — PROGRESS NOTE BEHAVIORAL HEALTH - NSBHCHARTREVIEWVS_PSY_A_CORE FT
T(C): --  HR: 83 (11-19-20 @ 06:10) (82 - 86)  BP: 161/114 (11-19-20 @ 06:10) (161/114 - 176/111)  RR: 18 (11-18-20 @ 15:49) (18 - 18)  SpO2: 97% (11-18-20 @ 15:49) (97% - 97%)

## 2020-11-19 NOTE — DISCHARGE NOTE PROVIDER - NSDCCPCAREPLAN_GEN_ALL_CORE_FT
PRINCIPAL DISCHARGE DIAGNOSIS  Diagnosis: Altered mental status, unspecified altered mental status type  Assessment and Plan of Treatment:

## 2020-11-19 NOTE — DISCHARGE NOTE PROVIDER - PROVIDER TOKENS
FREE:[LAST:[Your PCP in a week],PHONE:[(   )    -],FAX:[(   )    -]],PROVIDER:[TOKEN:[[id number]],FOLLOWUP:[2 weeks]]

## 2020-11-19 NOTE — PROGRESS NOTE BEHAVIORAL HEALTH - RISK ASSESSMENT
Chronic risk factors: seemingly chronic psychosocial stressors including housing/financial issues; care-taking two small kids; chronic serious medical issues; hx of partial compliance with medical treatmnet; charted hx of substance use (THC).  Protective factors: female gender, young; healthy; no hx of psych admissions, denies hx of suicide attempts/self-injurious behavior; denies hx of aggression/violence; has current ample  including domicile, etc; access to health services; denies access to guns. No acute risk factors identified

## 2020-11-20 LAB
CULTURE RESULTS: SIGNIFICANT CHANGE UP
CULTURE RESULTS: SIGNIFICANT CHANGE UP
SPECIMEN SOURCE: SIGNIFICANT CHANGE UP
SPECIMEN SOURCE: SIGNIFICANT CHANGE UP

## 2020-11-24 DIAGNOSIS — I08.1 RHEUMATIC DISORDERS OF BOTH MITRAL AND TRICUSPID VALVES: ICD-10-CM

## 2020-11-24 DIAGNOSIS — N17.9 ACUTE KIDNEY FAILURE, UNSPECIFIED: ICD-10-CM

## 2020-11-24 DIAGNOSIS — F05 DELIRIUM DUE TO KNOWN PHYSIOLOGICAL CONDITION: ICD-10-CM

## 2020-11-24 DIAGNOSIS — Z79.82 LONG TERM (CURRENT) USE OF ASPIRIN: ICD-10-CM

## 2020-11-24 DIAGNOSIS — R41.0 DISORIENTATION, UNSPECIFIED: ICD-10-CM

## 2020-11-24 DIAGNOSIS — E16.2 HYPOGLYCEMIA, UNSPECIFIED: ICD-10-CM

## 2020-11-24 DIAGNOSIS — I50.22 CHRONIC SYSTOLIC (CONGESTIVE) HEART FAILURE: ICD-10-CM
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
## 2020-11-24 DIAGNOSIS — I11.0 HYPERTENSIVE HEART DISEASE WITH HEART FAILURE: ICD-10-CM

## 2020-11-24 DIAGNOSIS — Z91.14 PATIENT'S OTHER NONCOMPLIANCE WITH MEDICATION REGIMEN: ICD-10-CM

## 2020-11-24 DIAGNOSIS — R00.1 BRADYCARDIA, UNSPECIFIED: ICD-10-CM

## 2020-11-24 DIAGNOSIS — T24.002A BURN OF UNSPECIFIED DEGREE OF UNSPECIFIED SITE OF LEFT LOWER LIMB, EXCEPT ANKLE AND FOOT, INITIAL ENCOUNTER: ICD-10-CM

## 2020-11-24 DIAGNOSIS — I95.9 HYPOTENSION, UNSPECIFIED: ICD-10-CM

## 2020-11-24 DIAGNOSIS — R41.82 ALTERED MENTAL STATUS, UNSPECIFIED: ICD-10-CM

## 2020-11-24 DIAGNOSIS — I37.1 NONRHEUMATIC PULMONARY VALVE INSUFFICIENCY: ICD-10-CM
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
## 2020-11-24 DIAGNOSIS — T24.001A BURN OF UNSPECIFIED DEGREE OF UNSPECIFIED SITE OF RIGHT LOWER LIMB, EXCEPT ANKLE AND FOOT, INITIAL ENCOUNTER: ICD-10-CM

## 2020-11-24 DIAGNOSIS — I27.20 PULMONARY HYPERTENSION, UNSPECIFIED: ICD-10-CM

## 2020-11-24 DIAGNOSIS — I42.9 CARDIOMYOPATHY, UNSPECIFIED: ICD-10-CM

## 2020-11-24 DIAGNOSIS — Z20.828 CONTACT WITH AND (SUSPECTED) EXPOSURE TO OTHER VIRAL COMMUNICABLE DISEASES: ICD-10-CM

## 2021-04-12 NOTE — ED ADULT TRIAGE NOTE - HEIGHT IN FEET
**Required when the History and Physical has been performed prior to Registration**  (within a 30 day period, if it's over 30 days then a new and complete History and Physicial needs to be completed)  **An update to the history and phsycial must be completed prior to the start of the surgery or procedure requiring anesthesia services. **    Prior to the procedure the History and Physicial from date 3/30/2021_ has been reviewed and I have examined the patient. Based upon my physicial assessment and interview of the patient:    Check and/or complete:    [X] No significant changes have occurred in the patient's condition since the History and Physical was completed.       OR    [_] Changes to History and Physical (see below):     _ 5

## 2021-04-22 NOTE — DISCHARGE NOTE PROVIDER - CARE PROVIDERS DIRECT ADDRESSES
Called patient and informed patient that colonoscopy on 7/7/21 time was changed to 1430.  Patient verbalized understanding.   ,DirectAddress_Unknown,[email or web address]